# Patient Record
Sex: MALE | Race: WHITE | NOT HISPANIC OR LATINO | Employment: OTHER | URBAN - METROPOLITAN AREA
[De-identification: names, ages, dates, MRNs, and addresses within clinical notes are randomized per-mention and may not be internally consistent; named-entity substitution may affect disease eponyms.]

---

## 2021-07-31 ENCOUNTER — HOSPITAL ENCOUNTER (INPATIENT)
Facility: HOSPITAL | Age: 41
LOS: 3 days | Discharge: HOME | DRG: 280 | End: 2021-08-03
Attending: EMERGENCY MEDICINE | Admitting: INTERNAL MEDICINE
Payer: COMMERCIAL

## 2021-07-31 ENCOUNTER — APPOINTMENT (EMERGENCY)
Dept: RADIOLOGY | Facility: HOSPITAL | Age: 41
DRG: 280 | End: 2021-07-31
Payer: COMMERCIAL

## 2021-07-31 ENCOUNTER — APPOINTMENT (EMERGENCY)
Dept: CT IMAGING | Facility: HOSPITAL | Age: 41
DRG: 280 | End: 2021-07-31
Payer: COMMERCIAL

## 2021-07-31 DIAGNOSIS — R06.00 DYSPNEA: ICD-10-CM

## 2021-07-31 DIAGNOSIS — R14.0 ABDOMINAL DISTENSION: Primary | ICD-10-CM

## 2021-07-31 DIAGNOSIS — K76.9 LIVER LESION, LEFT LOBE: ICD-10-CM

## 2021-07-31 DIAGNOSIS — R79.89 ELEVATED LACTIC ACID LEVEL: ICD-10-CM

## 2021-07-31 DIAGNOSIS — K21.9 GASTROESOPHAGEAL REFLUX DISEASE, UNSPECIFIED WHETHER ESOPHAGITIS PRESENT: ICD-10-CM

## 2021-07-31 DIAGNOSIS — K80.20 CHOLELITHIASIS WITHOUT CHOLECYSTITIS: ICD-10-CM

## 2021-07-31 DIAGNOSIS — R19.7 DIARRHEA, UNSPECIFIED TYPE: ICD-10-CM

## 2021-07-31 DIAGNOSIS — F10.20 ALCOHOL USE DISORDER, SEVERE, DEPENDENCE (HCC): ICD-10-CM

## 2021-07-31 DIAGNOSIS — E11.649 HYPOGLYCEMIC EPISODE IN PATIENT WITH DIABETES MELLITUS (HCC): ICD-10-CM

## 2021-07-31 DIAGNOSIS — F10.10 ALCOHOL ABUSE: ICD-10-CM

## 2021-07-31 DIAGNOSIS — R10.9 ABDOMINAL PAIN: ICD-10-CM

## 2021-07-31 DIAGNOSIS — K74.60 CIRRHOSIS OF LIVER WITHOUT ASCITES (HCC): ICD-10-CM

## 2021-07-31 LAB
ALBUMIN SERPL BCP-MCNC: 3.7 G/DL (ref 3.5–5)
ALP SERPL-CCNC: 110 U/L (ref 46–116)
ALT SERPL W P-5'-P-CCNC: 94 U/L (ref 12–78)
ANION GAP SERPL CALCULATED.3IONS-SCNC: 11 MMOL/L (ref 4–13)
APTT PPP: 26 SECONDS (ref 23–37)
AST SERPL W P-5'-P-CCNC: 89 U/L (ref 5–45)
BASOPHILS # BLD AUTO: 0.04 THOUSANDS/ΜL (ref 0–0.1)
BASOPHILS NFR BLD AUTO: 0 % (ref 0–1)
BILIRUB DIRECT SERPL-MCNC: 0.28 MG/DL (ref 0–0.2)
BILIRUB SERPL-MCNC: 0.99 MG/DL (ref 0.2–1)
BILIRUB UR QL STRIP: NEGATIVE
BUN SERPL-MCNC: 9 MG/DL (ref 5–25)
CALCIUM SERPL-MCNC: 9.1 MG/DL (ref 8.3–10.1)
CHLORIDE SERPL-SCNC: 98 MMOL/L (ref 100–108)
CLARITY UR: CLEAR
CO2 SERPL-SCNC: 29 MMOL/L (ref 21–32)
COLOR UR: ABNORMAL
CREAT SERPL-MCNC: 1.06 MG/DL (ref 0.6–1.3)
EOSINOPHIL # BLD AUTO: 0.06 THOUSAND/ΜL (ref 0–0.61)
EOSINOPHIL NFR BLD AUTO: 1 % (ref 0–6)
ERYTHROCYTE [DISTWIDTH] IN BLOOD BY AUTOMATED COUNT: 13.8 % (ref 11.6–15.1)
ETHANOL SERPL-MCNC: <3 MG/DL (ref 0–3)
GFR SERPL CREATININE-BSD FRML MDRD: 86 ML/MIN/1.73SQ M
GLUCOSE SERPL-MCNC: 161 MG/DL (ref 65–140)
GLUCOSE SERPL-MCNC: 31 MG/DL (ref 65–140)
GLUCOSE SERPL-MCNC: 96 MG/DL (ref 65–140)
GLUCOSE UR STRIP-MCNC: ABNORMAL MG/DL
HCT VFR BLD AUTO: 45.5 % (ref 36.5–49.3)
HGB BLD-MCNC: 15 G/DL (ref 12–17)
HGB UR QL STRIP.AUTO: NEGATIVE
IMM GRANULOCYTES # BLD AUTO: 0.04 THOUSAND/UL (ref 0–0.2)
IMM GRANULOCYTES NFR BLD AUTO: 0 % (ref 0–2)
INR PPP: 1.06 (ref 0.84–1.19)
KETONES UR STRIP-MCNC: NEGATIVE MG/DL
LACTATE SERPL-SCNC: 1.9 MMOL/L (ref 0.5–2)
LACTATE SERPL-SCNC: 2.6 MMOL/L (ref 0.5–2)
LEUKOCYTE ESTERASE UR QL STRIP: NEGATIVE
LIPASE SERPL-CCNC: 66 U/L (ref 73–393)
LYMPHOCYTES # BLD AUTO: 2.47 THOUSANDS/ΜL (ref 0.6–4.47)
LYMPHOCYTES NFR BLD AUTO: 26 % (ref 14–44)
MAGNESIUM SERPL-MCNC: 1.9 MG/DL (ref 1.6–2.6)
MCH RBC QN AUTO: 31.6 PG (ref 26.8–34.3)
MCHC RBC AUTO-ENTMCNC: 33 G/DL (ref 31.4–37.4)
MCV RBC AUTO: 96 FL (ref 82–98)
MONOCYTES # BLD AUTO: 0.86 THOUSAND/ΜL (ref 0.17–1.22)
MONOCYTES NFR BLD AUTO: 9 % (ref 4–12)
NEUTROPHILS # BLD AUTO: 5.94 THOUSANDS/ΜL (ref 1.85–7.62)
NEUTS SEG NFR BLD AUTO: 64 % (ref 43–75)
NITRITE UR QL STRIP: NEGATIVE
NRBC BLD AUTO-RTO: 0 /100 WBCS
NT-PROBNP SERPL-MCNC: 35 PG/ML
PH UR STRIP.AUTO: 6 [PH]
PLATELET # BLD AUTO: 254 THOUSANDS/UL (ref 149–390)
PMV BLD AUTO: 10.6 FL (ref 8.9–12.7)
POTASSIUM SERPL-SCNC: 4.1 MMOL/L (ref 3.5–5.3)
PROT SERPL-MCNC: 8.1 G/DL (ref 6.4–8.2)
PROT UR STRIP-MCNC: NEGATIVE MG/DL
PROTHROMBIN TIME: 13.3 SECONDS (ref 11.6–14.5)
RBC # BLD AUTO: 4.74 MILLION/UL (ref 3.88–5.62)
SODIUM SERPL-SCNC: 138 MMOL/L (ref 136–145)
SP GR UR STRIP.AUTO: <=1.005 (ref 1–1.03)
TROPONIN I SERPL-MCNC: <0.02 NG/ML
UROBILINOGEN UR QL STRIP.AUTO: 0.2 E.U./DL
WBC # BLD AUTO: 9.41 THOUSAND/UL (ref 4.31–10.16)

## 2021-07-31 PROCEDURE — 87040 BLOOD CULTURE FOR BACTERIA: CPT | Performed by: EMERGENCY MEDICINE

## 2021-07-31 PROCEDURE — 82077 ASSAY SPEC XCP UR&BREATH IA: CPT | Performed by: EMERGENCY MEDICINE

## 2021-07-31 PROCEDURE — 71045 X-RAY EXAM CHEST 1 VIEW: CPT

## 2021-07-31 PROCEDURE — 80076 HEPATIC FUNCTION PANEL: CPT | Performed by: EMERGENCY MEDICINE

## 2021-07-31 PROCEDURE — 83735 ASSAY OF MAGNESIUM: CPT | Performed by: EMERGENCY MEDICINE

## 2021-07-31 PROCEDURE — 85730 THROMBOPLASTIN TIME PARTIAL: CPT | Performed by: EMERGENCY MEDICINE

## 2021-07-31 PROCEDURE — 81003 URINALYSIS AUTO W/O SCOPE: CPT | Performed by: EMERGENCY MEDICINE

## 2021-07-31 PROCEDURE — 84145 PROCALCITONIN (PCT): CPT | Performed by: EMERGENCY MEDICINE

## 2021-07-31 PROCEDURE — 71275 CT ANGIOGRAPHY CHEST: CPT

## 2021-07-31 PROCEDURE — 83880 ASSAY OF NATRIURETIC PEPTIDE: CPT | Performed by: EMERGENCY MEDICINE

## 2021-07-31 PROCEDURE — 83605 ASSAY OF LACTIC ACID: CPT | Performed by: EMERGENCY MEDICINE

## 2021-07-31 PROCEDURE — 36415 COLL VENOUS BLD VENIPUNCTURE: CPT | Performed by: EMERGENCY MEDICINE

## 2021-07-31 PROCEDURE — 96361 HYDRATE IV INFUSION ADD-ON: CPT

## 2021-07-31 PROCEDURE — 93005 ELECTROCARDIOGRAM TRACING: CPT

## 2021-07-31 PROCEDURE — 80048 BASIC METABOLIC PNL TOTAL CA: CPT | Performed by: EMERGENCY MEDICINE

## 2021-07-31 PROCEDURE — 85610 PROTHROMBIN TIME: CPT | Performed by: EMERGENCY MEDICINE

## 2021-07-31 PROCEDURE — 99285 EMERGENCY DEPT VISIT HI MDM: CPT | Performed by: EMERGENCY MEDICINE

## 2021-07-31 PROCEDURE — 82948 REAGENT STRIP/BLOOD GLUCOSE: CPT

## 2021-07-31 PROCEDURE — 84484 ASSAY OF TROPONIN QUANT: CPT | Performed by: EMERGENCY MEDICINE

## 2021-07-31 PROCEDURE — 96374 THER/PROPH/DIAG INJ IV PUSH: CPT

## 2021-07-31 PROCEDURE — 85025 COMPLETE CBC W/AUTO DIFF WBC: CPT | Performed by: EMERGENCY MEDICINE

## 2021-07-31 PROCEDURE — 99285 EMERGENCY DEPT VISIT HI MDM: CPT

## 2021-07-31 PROCEDURE — 83690 ASSAY OF LIPASE: CPT | Performed by: EMERGENCY MEDICINE

## 2021-07-31 PROCEDURE — 74177 CT ABD & PELVIS W/CONTRAST: CPT

## 2021-07-31 RX ORDER — MORPHINE SULFATE 4 MG/ML
4 INJECTION, SOLUTION INTRAMUSCULAR; INTRAVENOUS ONCE
Status: COMPLETED | OUTPATIENT
Start: 2021-07-31 | End: 2021-07-31

## 2021-07-31 RX ORDER — FOLIC ACID 1 MG/1
1 TABLET ORAL DAILY
Status: DISCONTINUED | OUTPATIENT
Start: 2021-08-01 | End: 2021-08-03 | Stop reason: HOSPADM

## 2021-07-31 RX ORDER — LORAZEPAM 1 MG/1
1 TABLET ORAL ONCE
Status: COMPLETED | OUTPATIENT
Start: 2021-07-31 | End: 2021-07-31

## 2021-07-31 RX ORDER — DEXTROSE MONOHYDRATE 25 G/50ML
50 INJECTION, SOLUTION INTRAVENOUS ONCE
Status: COMPLETED | OUTPATIENT
Start: 2021-07-31 | End: 2021-07-31

## 2021-07-31 RX ORDER — LANOLIN ALCOHOL/MO/W.PET/CERES
100 CREAM (GRAM) TOPICAL DAILY
Status: DISCONTINUED | OUTPATIENT
Start: 2021-08-01 | End: 2021-08-03 | Stop reason: HOSPADM

## 2021-07-31 RX ADMIN — LORAZEPAM 1 MG: 1 TABLET ORAL at 22:28

## 2021-07-31 RX ADMIN — SODIUM CHLORIDE 500 ML: 0.9 INJECTION, SOLUTION INTRAVENOUS at 20:05

## 2021-07-31 RX ADMIN — MORPHINE SULFATE 4 MG: 4 INJECTION INTRAVENOUS at 20:05

## 2021-07-31 RX ADMIN — IOHEXOL 100 ML: 350 INJECTION, SOLUTION INTRAVENOUS at 20:52

## 2021-07-31 RX ADMIN — DEXTROSE MONOHYDRATE 50 ML: 25 INJECTION, SOLUTION INTRAVENOUS at 22:28

## 2021-08-01 ENCOUNTER — APPOINTMENT (INPATIENT)
Dept: MRI IMAGING | Facility: HOSPITAL | Age: 41
DRG: 280 | End: 2021-08-01
Payer: COMMERCIAL

## 2021-08-01 ENCOUNTER — APPOINTMENT (INPATIENT)
Dept: RADIOLOGY | Facility: HOSPITAL | Age: 41
DRG: 280 | End: 2021-08-01
Payer: COMMERCIAL

## 2021-08-01 PROBLEM — R65.10 SIRS (SYSTEMIC INFLAMMATORY RESPONSE SYNDROME) (HCC): Status: ACTIVE | Noted: 2021-08-01

## 2021-08-01 PROBLEM — K70.30 ALCOHOLIC CIRRHOSIS OF LIVER WITHOUT ASCITES (HCC): Status: ACTIVE | Noted: 2021-08-01

## 2021-08-01 PROBLEM — K21.9 GERD (GASTROESOPHAGEAL REFLUX DISEASE): Status: ACTIVE | Noted: 2021-08-01

## 2021-08-01 PROBLEM — E10.9 DIABETES MELLITUS TYPE 1 (HCC): Status: ACTIVE | Noted: 2021-08-01

## 2021-08-01 PROBLEM — K76.9 HEPATIC LESION: Status: ACTIVE | Noted: 2021-08-01

## 2021-08-01 PROBLEM — F10.20 ALCOHOL USE DISORDER, SEVERE, DEPENDENCE (HCC): Status: ACTIVE | Noted: 2021-08-01

## 2021-08-01 LAB
AFP-TM SERPL-MCNC: 4.2 NG/ML (ref 0.5–8)
ATRIAL RATE: 103 BPM
ATRIAL RATE: 109 BPM
GLUCOSE SERPL-MCNC: 313 MG/DL (ref 65–140)
GLUCOSE SERPL-MCNC: 331 MG/DL (ref 65–140)
GLUCOSE SERPL-MCNC: 332 MG/DL (ref 65–140)
GLUCOSE SERPL-MCNC: 353 MG/DL (ref 65–140)
GLUCOSE SERPL-MCNC: 359 MG/DL (ref 65–140)
P AXIS: 115 DEGREES
P AXIS: 59 DEGREES
PR INTERVAL: 132 MS
PR INTERVAL: 136 MS
PROCALCITONIN SERPL-MCNC: 0.07 NG/ML
QRS AXIS: -6 DEGREES
QRS AXIS: 183 DEGREES
QRSD INTERVAL: 68 MS
QRSD INTERVAL: 76 MS
QT INTERVAL: 338 MS
QT INTERVAL: 348 MS
QTC INTERVAL: 455 MS
QTC INTERVAL: 455 MS
SARS-COV-2 RNA RESP QL NAA+PROBE: NEGATIVE
T WAVE AXIS: 139 DEGREES
T WAVE AXIS: 44 DEGREES
TROPONIN I SERPL-MCNC: <0.02 NG/ML
VENTRICULAR RATE: 103 BPM
VENTRICULAR RATE: 109 BPM

## 2021-08-01 PROCEDURE — 82948 REAGENT STRIP/BLOOD GLUCOSE: CPT

## 2021-08-01 PROCEDURE — 94660 CPAP INITIATION&MGMT: CPT

## 2021-08-01 PROCEDURE — 86235 NUCLEAR ANTIGEN ANTIBODY: CPT | Performed by: PHYSICIAN ASSISTANT

## 2021-08-01 PROCEDURE — 99223 1ST HOSP IP/OBS HIGH 75: CPT | Performed by: FAMILY MEDICINE

## 2021-08-01 PROCEDURE — U0003 INFECTIOUS AGENT DETECTION BY NUCLEIC ACID (DNA OR RNA); SEVERE ACUTE RESPIRATORY SYNDROME CORONAVIRUS 2 (SARS-COV-2) (CORONAVIRUS DISEASE [COVID-19]), AMPLIFIED PROBE TECHNIQUE, MAKING USE OF HIGH THROUGHPUT TECHNOLOGIES AS DESCRIBED BY CMS-2020-01-R: HCPCS | Performed by: PHYSICIAN ASSISTANT

## 2021-08-01 PROCEDURE — 86256 FLUORESCENT ANTIBODY TITER: CPT | Performed by: PHYSICIAN ASSISTANT

## 2021-08-01 PROCEDURE — 36415 COLL VENOUS BLD VENIPUNCTURE: CPT | Performed by: PHYSICIAN ASSISTANT

## 2021-08-01 PROCEDURE — U0005 INFEC AGEN DETEC AMPLI PROBE: HCPCS | Performed by: PHYSICIAN ASSISTANT

## 2021-08-01 PROCEDURE — A9585 GADOBUTROL INJECTION: HCPCS | Performed by: PHYSICIAN ASSISTANT

## 2021-08-01 PROCEDURE — 94760 N-INVAS EAR/PLS OXIMETRY 1: CPT

## 2021-08-01 PROCEDURE — 74183 MRI ABD W/O CNTR FLWD CNTR: CPT

## 2021-08-01 PROCEDURE — 84484 ASSAY OF TROPONIN QUANT: CPT | Performed by: PHYSICIAN ASSISTANT

## 2021-08-01 PROCEDURE — 80074 ACUTE HEPATITIS PANEL: CPT | Performed by: PHYSICIAN ASSISTANT

## 2021-08-01 PROCEDURE — 99255 IP/OBS CONSLTJ NEW/EST HI 80: CPT | Performed by: INTERNAL MEDICINE

## 2021-08-01 PROCEDURE — G1004 CDSM NDSC: HCPCS

## 2021-08-01 PROCEDURE — 87522 HEPATITIS C REVRS TRNSCRPJ: CPT | Performed by: PHYSICIAN ASSISTANT

## 2021-08-01 PROCEDURE — 82105 ALPHA-FETOPROTEIN SERUM: CPT | Performed by: PHYSICIAN ASSISTANT

## 2021-08-01 PROCEDURE — 93010 ELECTROCARDIOGRAM REPORT: CPT | Performed by: INTERNAL MEDICINE

## 2021-08-01 RX ORDER — ACETAMINOPHEN 325 MG/1
650 TABLET ORAL EVERY 6 HOURS PRN
Status: DISCONTINUED | OUTPATIENT
Start: 2021-08-01 | End: 2021-08-03 | Stop reason: HOSPADM

## 2021-08-01 RX ORDER — POLYETHYLENE GLYCOL 3350 17 G/17G
238 POWDER, FOR SOLUTION ORAL ONCE
Status: COMPLETED | OUTPATIENT
Start: 2021-08-01 | End: 2021-08-01

## 2021-08-01 RX ORDER — LISINOPRIL 20 MG/1
20 TABLET ORAL 2 TIMES DAILY
COMMUNITY

## 2021-08-01 RX ORDER — LIDOCAINE HYDROCHLORIDE 20 MG/ML
15 SOLUTION OROPHARYNGEAL 4 TIMES DAILY PRN
Status: DISCONTINUED | OUTPATIENT
Start: 2021-08-01 | End: 2021-08-03 | Stop reason: HOSPADM

## 2021-08-01 RX ORDER — INSULIN GLARGINE 100 [IU]/ML
INJECTION, SOLUTION SUBCUTANEOUS
COMMUNITY

## 2021-08-01 RX ORDER — OMEGA-3S/DHA/EPA/FISH OIL/D3 300MG-1000
400 CAPSULE ORAL DAILY
COMMUNITY

## 2021-08-01 RX ORDER — POLYETHYLENE GLYCOL 3350 17 G/17G
17 POWDER, FOR SOLUTION ORAL DAILY
Status: DISCONTINUED | OUTPATIENT
Start: 2021-08-01 | End: 2021-08-03 | Stop reason: HOSPADM

## 2021-08-01 RX ORDER — INSULIN GLARGINE 100 [IU]/ML
40 INJECTION, SOLUTION SUBCUTANEOUS
Status: DISCONTINUED | OUTPATIENT
Start: 2021-08-01 | End: 2021-08-02

## 2021-08-01 RX ORDER — INSULIN GLARGINE 100 [IU]/ML
35 INJECTION, SOLUTION SUBCUTANEOUS EVERY MORNING
Status: DISCONTINUED | OUTPATIENT
Start: 2021-08-01 | End: 2021-08-02

## 2021-08-01 RX ORDER — LANOLIN ALCOHOL/MO/W.PET/CERES
6 CREAM (GRAM) TOPICAL
Status: DISCONTINUED | OUTPATIENT
Start: 2021-08-01 | End: 2021-08-03 | Stop reason: HOSPADM

## 2021-08-01 RX ORDER — DIPHENOXYLATE HYDROCHLORIDE AND ATROPINE SULFATE 2.5; .025 MG/1; MG/1
1 TABLET ORAL DAILY
COMMUNITY

## 2021-08-01 RX ORDER — CHLORDIAZEPOXIDE HYDROCHLORIDE 10 MG/1
10 CAPSULE, GELATIN COATED ORAL EVERY 8 HOURS SCHEDULED
Status: COMPLETED | OUTPATIENT
Start: 2021-08-02 | End: 2021-08-02

## 2021-08-01 RX ORDER — DICYCLOMINE HCL 20 MG
20 TABLET ORAL 3 TIMES DAILY PRN
Status: DISCONTINUED | OUTPATIENT
Start: 2021-08-01 | End: 2021-08-03 | Stop reason: HOSPADM

## 2021-08-01 RX ORDER — PANTOPRAZOLE SODIUM 40 MG/1
40 TABLET, DELAYED RELEASE ORAL DAILY
COMMUNITY

## 2021-08-01 RX ORDER — MAGNESIUM HYDROXIDE/ALUMINUM HYDROXICE/SIMETHICONE 120; 1200; 1200 MG/30ML; MG/30ML; MG/30ML
30 SUSPENSION ORAL EVERY 6 HOURS PRN
Status: DISCONTINUED | OUTPATIENT
Start: 2021-08-01 | End: 2021-08-03 | Stop reason: HOSPADM

## 2021-08-01 RX ORDER — CHLORDIAZEPOXIDE HYDROCHLORIDE 10 MG/1
10 CAPSULE, GELATIN COATED ORAL EVERY 6 HOURS SCHEDULED
Status: COMPLETED | OUTPATIENT
Start: 2021-08-01 | End: 2021-08-01

## 2021-08-01 RX ORDER — FUROSEMIDE 20 MG/1
20 TABLET ORAL DAILY
Status: DISCONTINUED | OUTPATIENT
Start: 2021-08-01 | End: 2021-08-03 | Stop reason: HOSPADM

## 2021-08-01 RX ORDER — DICYCLOMINE HCL 20 MG
20 TABLET ORAL 3 TIMES DAILY
COMMUNITY

## 2021-08-01 RX ORDER — LISINOPRIL 20 MG/1
20 TABLET ORAL 2 TIMES DAILY
Status: DISCONTINUED | OUTPATIENT
Start: 2021-08-01 | End: 2021-08-03 | Stop reason: HOSPADM

## 2021-08-01 RX ORDER — FUROSEMIDE 20 MG/1
20 TABLET ORAL DAILY
COMMUNITY

## 2021-08-01 RX ORDER — CHLORDIAZEPOXIDE HYDROCHLORIDE 10 MG/1
10 CAPSULE, GELATIN COATED ORAL
Status: DISCONTINUED | OUTPATIENT
Start: 2021-08-04 | End: 2021-08-03 | Stop reason: HOSPADM

## 2021-08-01 RX ORDER — NICOTINE 21 MG/24HR
14 PATCH, TRANSDERMAL 24 HOURS TRANSDERMAL DAILY
Status: DISCONTINUED | OUTPATIENT
Start: 2021-08-01 | End: 2021-08-03 | Stop reason: HOSPADM

## 2021-08-01 RX ORDER — DOCUSATE SODIUM 100 MG/1
100 CAPSULE, LIQUID FILLED ORAL 2 TIMES DAILY
Status: DISCONTINUED | OUTPATIENT
Start: 2021-08-01 | End: 2021-08-03 | Stop reason: HOSPADM

## 2021-08-01 RX ORDER — CHLORDIAZEPOXIDE HYDROCHLORIDE 10 MG/1
10 CAPSULE, GELATIN COATED ORAL EVERY 12 HOURS SCHEDULED
Status: DISCONTINUED | OUTPATIENT
Start: 2021-08-03 | End: 2021-08-03 | Stop reason: HOSPADM

## 2021-08-01 RX ORDER — PANTOPRAZOLE SODIUM 40 MG/1
40 TABLET, DELAYED RELEASE ORAL
Status: DISCONTINUED | OUTPATIENT
Start: 2021-08-01 | End: 2021-08-03 | Stop reason: HOSPADM

## 2021-08-01 RX ADMIN — INSULIN LISPRO 8 UNITS: 100 INJECTION, SOLUTION INTRAVENOUS; SUBCUTANEOUS at 08:51

## 2021-08-01 RX ADMIN — GADOBUTROL 12 ML: 604.72 INJECTION INTRAVENOUS at 14:26

## 2021-08-01 RX ADMIN — INSULIN GLARGINE 35 UNITS: 100 INJECTION, SOLUTION SUBCUTANEOUS at 08:51

## 2021-08-01 RX ADMIN — ACETAMINOPHEN 650 MG: 325 TABLET, FILM COATED ORAL at 22:15

## 2021-08-01 RX ADMIN — FOLIC ACID 1 MG: 1 TABLET ORAL at 08:50

## 2021-08-01 RX ADMIN — THIAMINE HCL TAB 100 MG 100 MG: 100 TAB at 08:50

## 2021-08-01 RX ADMIN — POLYETHYLENE GLYCOL 3350 17 G: 17 POWDER, FOR SOLUTION ORAL at 15:15

## 2021-08-01 RX ADMIN — LISINOPRIL 20 MG: 20 TABLET ORAL at 08:50

## 2021-08-01 RX ADMIN — DOCUSATE SODIUM 100 MG: 100 CAPSULE, LIQUID FILLED ORAL at 19:43

## 2021-08-01 RX ADMIN — CHLORDIAZEPOXIDE HYDROCHLORIDE 10 MG: 10 CAPSULE ORAL at 08:50

## 2021-08-01 RX ADMIN — LISINOPRIL 20 MG: 20 TABLET ORAL at 22:15

## 2021-08-01 RX ADMIN — CHLORDIAZEPOXIDE HYDROCHLORIDE 10 MG: 10 CAPSULE ORAL at 02:15

## 2021-08-01 RX ADMIN — CHLORDIAZEPOXIDE HYDROCHLORIDE 10 MG: 10 CAPSULE ORAL at 19:42

## 2021-08-01 RX ADMIN — INSULIN LISPRO 8 UNITS: 100 INJECTION, SOLUTION INTRAVENOUS; SUBCUTANEOUS at 11:55

## 2021-08-01 RX ADMIN — FUROSEMIDE 20 MG: 40 TABLET ORAL at 08:50

## 2021-08-01 RX ADMIN — MELATONIN TAB 3 MG 6 MG: 3 TAB at 23:02

## 2021-08-01 RX ADMIN — POLYETHYLENE GLYCOL 3350 238 G: 17 POWDER, FOR SOLUTION ORAL at 19:44

## 2021-08-01 RX ADMIN — ENOXAPARIN SODIUM 40 MG: 40 INJECTION SUBCUTANEOUS at 08:51

## 2021-08-01 RX ADMIN — INSULIN GLARGINE 40 UNITS: 100 INJECTION, SOLUTION SUBCUTANEOUS at 22:15

## 2021-08-01 RX ADMIN — INSULIN LISPRO 10 UNITS: 100 INJECTION, SOLUTION INTRAVENOUS; SUBCUTANEOUS at 22:16

## 2021-08-01 RX ADMIN — CHLORDIAZEPOXIDE HYDROCHLORIDE 10 MG: 10 CAPSULE ORAL at 15:14

## 2021-08-01 RX ADMIN — POLYETHYLENE GLYCOL 3350 17 G: 17 POWDER, FOR SOLUTION ORAL at 10:30

## 2021-08-01 RX ADMIN — MULTIPLE VITAMINS W/ MINERALS TAB 1 TABLET: TAB ORAL at 08:50

## 2021-08-01 RX ADMIN — DOCUSATE SODIUM 100 MG: 100 CAPSULE, LIQUID FILLED ORAL at 10:30

## 2021-08-01 RX ADMIN — NICOTINE 14 MG: 14 PATCH, EXTENDED RELEASE TRANSDERMAL at 22:15

## 2021-08-01 NOTE — UTILIZATION REVIEW
Initial Clinical Review    Admission: Date/Time/Statement:   Admission Orders (From admission, onward)     Ordered        07/31/21 2204  Inpatient Admission  Once                   Orders Placed This Encounter   Procedures    Inpatient Admission     Standing Status:   Standing     Number of Occurrences:   1     Order Specific Question:   Level of Care     Answer:   Med Surg [16]     Order Specific Question:   Estimated length of stay     Answer:   More than 2 Midnights     Order Specific Question:   Certification     Answer:   I certify that inpatient services are medically necessary for this patient for a duration of greater than two midnights  See H&P and MD Progress Notes for additional information about the patient's course of treatment  ED Arrival Information     Expected Arrival Acuity    - 7/31/2021 18:58 Urgent         Means of arrival Escorted by Service Admission type    [address] [place] [facility] General Medicine Urgent         Arrival complaint    Abdominal Pain/Pressure and leg & Back Pain        Chief Complaint   Patient presents with    Abdominal Pain     Patient reports abdominal pain for the last several months  Worsening today  Also has back pain  Denies n/v  Initial Presentation:   40y Male to ED presents with gradual worsening of chronic abdominal distension pain, specifically to left  Noted intermittent abdominal pain and abdominal distention past 6 months  20 lb wt gain last 2 months and poor appetite  Drinks alcohol heavily approx 6-10 20 oz cans of liquor daily  PMH for DM Type 2, GERD, Obesity and Alcohol use  Admit Inpatient level of care for Alcoholic cirrhosis of liver, Hepatic lesion and SIRS  Gastroenterology consult  Liver enzymes slightly elevated, avoid hepatotoxic agents  Drinks Tha's Hard lemonade 6-10 20 oz cans daily  CIWA assess  Start Librium taper  Bld culture x2 pending  Lactic acid elevated 2 6   CT a negative for PE, negative for ascites but revealing cirrhosis of the liver, multiple hemangiomas and a hypodense lesion  On exam; Abdomen is firm  Abdominal tenderness in the epigastric area and LUQ  + Guarding  Date: 8/1  Day 2:   GI cons; ETOH/ HCV cirrhosis, Abdominal pain and distension, Diarrhea, Nausea, Poor appetite and Indeterminate liver lesion on CT  MELD-8  Plan for EGD and Colonoscopy tomorrow 6/2  Cr Guerra Continue Dicyclomine and Pantoprazole  F/u MRI and AFP  Thiamine and folic acid  Monitor for signs of withdrawal      CIWA assess = 7      ED Triage Vitals [07/31/21 1904]   Temperature Pulse Respirations Blood Pressure SpO2   98 1 °F (36 7 °C) (!) 120 18 (!) 178/106 96 %      Temp Source Heart Rate Source Patient Position - Orthostatic VS BP Location FiO2 (%)   Temporal Monitor Sitting Left arm --      Pain Score       6          Wt Readings from Last 1 Encounters:   07/31/21 123 kg (272 lb)     Additional Vital Signs:   08/01/21 0847  97 8 °F (36 6 °C)  90  20  151/90  --  97 %  None (Room air)  --  Sitting   08/01/21 0645  --  75  22  157/79  102  97 %  --  --  --   08/01/21 0631  --  77  12  --  --  96 %  --  Face mask  --   08/01/21 0545  --  78  18  150/74  --  97 %  Nasal cannula         07/31/21 2233  --  99  --  148/100  --  --  --  --  --   07/31/21 2230  --  97  32  Abnormal   --  --  --  --  --  --   07/31/21 2145  --  97  23  Abnormal   --  --  95 %  --  --  --   07/31/21 2015  --  102  23  Abnormal   138/77  100  95 %  --  --  --   07/31/21 1945  --  119  Abnormal   --  --  --  95 %  --  --  --   07/31/21 1937  --  --  --  --  --  --  None (Room air)  --       Pertinent Labs/Diagnostic Test Results:   7/31  CTA chest CT abd/pelvis - There is no pulmonary embolism  The liver is cirrhotic  There is an indeterminate 0 9 x 0 8 cm hypodense lesion in the left hepatic lobe segment 2/3 (series 2 images 260 )  Recommended abdomen MRI with and without contrast on a nonemergent basis to characterize    Cholelithiasis without CT evidence of acute cholecystitis  There is no ascites to account for abdominal distention      PCXR -      Results from last 7 days   Lab Units 08/01/21  0321   SARS-COV-2  Negative     Results from last 7 days   Lab Units 07/31/21 2004   WBC Thousand/uL 9 41   HEMOGLOBIN g/dL 15 0   HEMATOCRIT % 45 5   PLATELETS Thousands/uL 254   NEUTROS ABS Thousands/µL 5 94         Results from last 7 days   Lab Units 07/31/21 2004   SODIUM mmol/L 138   POTASSIUM mmol/L 4 1   CHLORIDE mmol/L 98*   CO2 mmol/L 29   ANION GAP mmol/L 11   BUN mg/dL 9   CREATININE mg/dL 1 06   EGFR ml/min/1 73sq m 86   CALCIUM mg/dL 9 1   MAGNESIUM mg/dL 1 9     Results from last 7 days   Lab Units 07/31/21 2004   AST U/L 89*   ALT U/L 94*   ALK PHOS U/L 110   TOTAL PROTEIN g/dL 8 1   ALBUMIN g/dL 3 7   TOTAL BILIRUBIN mg/dL 0 99   BILIRUBIN DIRECT mg/dL 0 28*     Results from last 7 days   Lab Units 08/01/21  0801 07/31/21  2304 07/31/21  2219   POC GLUCOSE mg/dl 313* 96 31*     Results from last 7 days   Lab Units 07/31/21 2004   GLUCOSE RANDOM mg/dL 161*       Results from last 7 days   Lab Units 08/01/21  0321 07/31/21 2004   TROPONIN I ng/mL <0 02 <0 02         Results from last 7 days   Lab Units 07/31/21 2004   PROTIME seconds 13 3   INR  1 06   PTT seconds 26             Results from last 7 days   Lab Units 07/31/21  2310 07/31/21 2004   LACTIC ACID mmol/L 1 9 2 6*             Results from last 7 days   Lab Units 07/31/21 2004   NT-PRO BNP pg/mL 35             Results from last 7 days   Lab Units 07/31/21 2004   LIPASE u/L 66*             Results from last 7 days   Lab Units 07/31/21 2004   CLARITY UA  Clear   COLOR UA  Light Yellow   SPEC GRAV UA  <=1 005   PH UA  6 0   GLUCOSE UA mg/dl 500 (1/2%)*   KETONES UA mg/dl Negative   BLOOD UA  Negative   PROTEIN UA mg/dl Negative   NITRITE UA  Negative   BILIRUBIN UA  Negative   UROBILINOGEN UA E U /dl 0 2   LEUKOCYTES UA  Negative       Results from last 7 days   Lab Units 07/31/21  2310   ETHANOL LVL mg/dL <3       ED Treatment:   Medication Administration from 07/31/2021 1858 to 08/01/2021 1023       Date/Time Order Dose Route Action     07/31/2021 2005 sodium chloride 0 9 % bolus 500 mL 500 mL Intravenous New Bag     07/31/2021 2005 morphine (PF) 4 mg/mL injection 4 mg 4 mg Intravenous Given     07/31/2021 2052 iohexol (OMNIPAQUE) 350 MG/ML injection (MULTI-DOSE) 100 mL 100 mL Intravenous Given     07/31/2021 2228 LORazepam (ATIVAN) tablet 1 mg 1 mg Oral Given     07/31/2021 2228 dextrose 50 % IV solution 50 mL 50 mL Intravenous Given     08/01/2021 0850 thiamine tablet 100 mg 100 mg Oral Given     92/52/6479 7277 folic acid (FOLVITE) tablet 1 mg 1 mg Oral Given     08/01/2021 0850 multivitamin-minerals (CENTRUM) tablet 1 tablet 1 tablet Oral Given     08/01/2021 0850 furosemide (LASIX) tablet 20 mg 20 mg Oral Given     08/01/2021 0851 insulin glargine (LANTUS) subcutaneous injection 35 Units 0 35 mL 35 Units Subcutaneous Given     08/01/2021 0850 lisinopril (ZESTRIL) tablet 20 mg 20 mg Oral Given     08/01/2021 0851 enoxaparin (LOVENOX) subcutaneous injection 40 mg 40 mg Subcutaneous Given     08/01/2021 0851 insulin lispro (HumaLOG) 100 units/mL subcutaneous injection 2-12 Units 8 Units Subcutaneous Given     08/01/2021 0850 chlordiazePOXIDE (LIBRIUM) capsule 10 mg 10 mg Oral Given     08/01/2021 0215 chlordiazePOXIDE (LIBRIUM) capsule 10 mg 10 mg Oral Given        Past Medical History:   Diagnosis Date    Diabetes mellitus ([facility] )     GERD (gastroesophageal reflux disease)     Hypertension      Present on Admission:   Alcoholic cirrhosis of liver without ascites (HCC)   Diabetes mellitus type 1 (HCC)   SIRS (systemic inflammatory response syndrome) ([facility] )   Alcohol use disorder, severe, dependence ([facility] )      Admitting Diagnosis: Abdominal pain [R10 9]  Back pain [M54 9]  Age/Sex: 42 y o  male     Admission Orders:  Scheduled Medications:  chlordiazePOXIDE, 10 mg, Oral, Q6H [facility] & intermediate   Followed by  Dominik Bridges ON 8/2/2021] chlordiazePOXIDE, 10 mg, Oral, Q8H [address]   Followed by  Michael Guerrero ON 8/3/2021] chlordiazePOXIDE, 10 mg, Oral, Q12H [address]   Followed by  Michael Guerrero ON 8/4/2021] chlordiazePOXIDE, 10 mg, Oral, HS  docusate sodium, 100 mg, Oral, BID  enoxaparin, 40 mg, Subcutaneous, Daily  folic acid, 1 mg, Oral, Daily  furosemide, 20 mg, Oral, Daily  insulin glargine, 35 Units, Subcutaneous, QAM  insulin glargine, 40 Units, Subcutaneous, HS  insulin lispro, 2-12 Units, Subcutaneous, TID AC  insulin lispro, 2-12 Units, Subcutaneous, HS  lisinopril, 20 mg, Oral, BID  multivitamin-minerals, 1 tablet, Oral, Daily  pantoprazole, 40 mg, Oral, Early Morning  polyethylene glycol, 17 g, Oral, Daily  thiamine, 100 mg, Oral, Daily      Continuous IV Infusions: None     PRN Meds:  aluminum-magnesium hydroxide-simethicone, 30 mL, Oral, Q6H PRN  dicyclomine, 20 mg, Oral, TID PRN  Lidocaine Viscous HCl, 15 mL, Swish & Swallow, 4x Daily PRN      Bld cultures x2  CIWA assess  Seizure precautions  Aspiration precautions  IP CONSULT TO GASTROENTEROLOGY  IP CONSULT TO CASE MANAGEMENT    Network Utilization Review Department  ATTENTION: Please call with any questions or concerns to [phone] and carefully listen to the prompts so that you are directed to the right person  All voicemails are confidential   Dheeraj Garvin all requests for admission clinical reviews, approved or denied determinations and any other requests to dedicated fax number below belonging to the campus where the patient is receiving treatment   List of dedicated fax numbers for the Facilities:  [address] DENIALS (Administrative/Medical Necessity) [phone]   [address] (Maternity/NICU/Pediatrics) 261 [facility] [place] 2000 [facility] [place] [address] [address] [facility] [facility] [phone]

## 2021-08-01 NOTE — ASSESSMENT & PLAN NOTE
· Noted on CT a  · Discussed close follow-up back in [place] with GI  · Appreciate GI consult here  · Discussed with patient about follow-up for MRI abdomen

## 2021-08-01 NOTE — ASSESSMENT & PLAN NOTE
· CT a negative for PE, negative for ascites but revealing cirrhosis of the liver, multiple hemangiomas and a hypodense lesion  · Appreciate GI consult due to severe abdominal distension  · Liver enzymes slightly elevated, avoid hepatotoxic agents

## 2021-08-01 NOTE — INCIDENTAL FINDINGS
The following findings require follow up:  Radiographic finding   Finding: "There is an indeterminate 0 9 x 0 8 cm hypodense lesion in the left hepatic lobe segment 2/3 (series 2 images 260 )  Recommended abdomen MRI with and without contrast on a nonemergent basis to characterize "       Follow up required:  With outpatient GI and primary care physician   Follow up should be done within 1 week(s)    Please notify the following clinician to assist with the follow up:   Gastroenterologist and primary care physician

## 2021-08-01 NOTE — ASSESSMENT & PLAN NOTE
· Meeting criteria due to tachycardia which has resolved and tachypnea most likely in the setting of abdominal pain  · No clear infectious source will hold off on antibiotics, BP stable so will hold off on IV fluids, given some in ED  · Blood culture x2 pending  · Lactic acid initially elevated 2 6, repeat WNL at 1 9

## 2021-08-01 NOTE — ASSESSMENT & PLAN NOTE
No results found for: HGBA1C    Recent Labs     07/31/21  2219 07/31/21  2304   POCGLU 31* 96       Blood Sugar Average: Last 72 hrs:  (P) 63 5   · Blood glucose checks q i d , hypoglycemia protocol  · Patient noted initially with blood glucose 168, decreased to 31 and now 96 after D50  · Closely monitor blood glucose but will resume home Lantus 35 units in a m  And 40 units q h s    · Sliding scale insulin

## 2021-08-01 NOTE — ED NOTES
Pt confused and severely diaphoretic  Blood glucose checked and registered at 31mg/dl  Dr Flor Green notified  Full amp D50 given IVP  Pt awake and alert through hypoglycemia event  Snack bag given and sandwich  Pt continued to tremor  I asked pt if he could be withdrawing from alcohol? Pt states yes  1mg Ativan PO   CIWA completed and score is 7  Pt states that he is feeling better  Will continue to monitor blood glucose as per protocol        Wilton Ventura RN  07/31/21 1076

## 2021-08-01 NOTE — ED PROVIDER NOTES
History  Chief Complaint   Patient presents with    Abdominal Pain     Patient reports abdominal pain for the last several months  Worsening today  Also has back pain  Denies n/v       Patient is a 58-year-old male with past medical history of insulin-dependent diabetes, GERD, hypertension, presents to the emergency department complaining of progressively worsening abdominal distension and pressure for the past several months but worsening over the past 1 month  Patient reports that he is currently visiting from [place] and has had ongoing issues with abdominal pain and distension for many months  Over the past 1 month that seems to have worsened and today he was doubled over in discomfort due to the pressure all throughout his abdomen  He states he has had significantly decreased appetite over the past several months and is not eating much despite gaining weight  He also reports feeling more short of breath over the past 1 month, both at rest and with exertion however exertion does worsen his breathing  Patient's brother reports that walking around today he was very winded and even sitting in the waiting room he was winded  Patient was told at 1 point that he may have cirrhosis and he does admit to alcohol use fairly regularly but has been trying to cut down  Patient reports he does feel some achiness in both legs as well  Over the past 1 month he has had intermittent bilious vomiting upon awakening  He denies any nausea or vomiting in the past couple of days  Patient also reports he gets low back pain also for the past 1 month getting progressively worse and at times he feels sharp pain in his right flank  He reports darker colored urine than normal but denies dysuria, gross hematuria or change in frequency  He does state at the end of urination he has more difficulty getting out his urine and has to push harder  This is been also new for the past 1 month  He reports some lightheadedness today    He reports chronic diarrhea  He denies any fever or chills, headache, neck pain or stiffness, cough, hemoptysis, URI symptoms, chest pain, palpitations, constipation, blood per rectum, melena, skin rash or color change, extremity swelling, extremity weakness or paresthesia other focal neurologic deficits  History provided by:  Patient   used: No    Abdominal Pain  Associated symptoms: diarrhea and shortness of breath    Associated symptoms: no chest pain, no chills, no constipation, no cough, no dysuria, no fever, no hematuria, no nausea, no sore throat and no vomiting        None       Past Medical History:   Diagnosis Date    Diabetes mellitus ([facility] [address] )     GERD (gastroesophageal reflux disease)     Hypertension        History reviewed  No pertinent surgical history  History reviewed  No pertinent family history  I have reviewed and agree with the history as documented  E-Cigarette/Vaping     E-Cigarette/Vaping Substances     Social History     Tobacco Use    Smoking status: Never Smoker    Smokeless tobacco: Current User     Types: Chew   Substance Use Topics    Alcohol use: Yes    Drug use: Not Currently     Types: Marijuana       Review of Systems   Constitutional: Positive for appetite change and unexpected weight change  Negative for chills and fever  HENT: Negative for congestion, ear pain, rhinorrhea and sore throat  Eyes: Negative for photophobia, pain and visual disturbance  Respiratory: Positive for shortness of breath  Negative for cough, chest tightness and wheezing  Cardiovascular: Negative for chest pain, palpitations and leg swelling  Gastrointestinal: Positive for abdominal distention, abdominal pain and diarrhea  Negative for blood in stool, constipation, nausea and vomiting  Genitourinary: Positive for difficulty urinating  Negative for dysuria, flank pain, frequency and hematuria     Musculoskeletal: Negative for back pain, neck pain and neck stiffness  Skin: Negative for color change, pallor, rash and wound  Allergic/Immunologic: Negative for immunocompromised state  Neurological: Negative for dizziness, syncope, weakness, light-headedness, numbness and headaches  Hematological: Negative for adenopathy  Does not bruise/bleed easily  Psychiatric/Behavioral: Negative for confusion and decreased concentration  All other systems reviewed and are negative  Physical Exam  Physical Exam  Vitals and nursing note reviewed  Constitutional:       General: He is not in acute distress  Appearance: Normal appearance  He is well-developed  He is not ill-appearing, toxic-appearing or diaphoretic  HENT:      Head: Normocephalic and atraumatic  Right Ear: External ear normal       Left Ear: External ear normal       Nose: Nose normal       Mouth/Throat:      Mouth: Mucous membranes are moist       Pharynx: Oropharynx is clear  No oropharyngeal exudate  Eyes:      Extraocular Movements: Extraocular movements intact  Conjunctiva/sclera: Conjunctivae normal       Pupils: Pupils are equal, round, and reactive to light  Neck:      Vascular: No JVD  Cardiovascular:      Rate and Rhythm: Regular rhythm  Tachycardia present  Pulses: Normal pulses  Heart sounds: Normal heart sounds  No murmur heard  No friction rub  No gallop  Pulmonary:      Effort: Pulmonary effort is normal  No respiratory distress  Breath sounds: Normal breath sounds  No wheezing, rhonchi or rales  Abdominal:      General: Bowel sounds are normal  There is distension  Palpations: Abdomen is soft  Tenderness: There is no abdominal tenderness  There is right CVA tenderness  There is no left CVA tenderness, guarding or rebound  Comments: Diffuse abdominal distension  Abdomen firm  Mild periumbilical and epigastric tenderness  Musculoskeletal:         General: No swelling or tenderness  Normal range of motion        Cervical back: Normal range of motion and neck supple  No rigidity  Skin:     General: Skin is warm and dry  Coloration: Skin is not pale  Findings: No erythema or rash  Neurological:      General: No focal deficit present  Mental Status: He is alert and oriented to person, place, and time  Sensory: No sensory deficit  Motor: No weakness  Psychiatric:         Mood and Affect: Mood normal          Behavior: Behavior normal          Vital Signs  ED Triage Vitals [07/31/21 1904]   Temperature Pulse Respirations Blood Pressure SpO2   98 1 °F (36 7 °C) (!) 120 18 (!) 178/106 96 %      Temp Source Heart Rate Source Patient Position - Orthostatic VS BP Location FiO2 (%)   Temporal Monitor Sitting Left arm --      Pain Score       6         Vitals:    07/31/21 2015 07/31/21 2145 07/31/21 2230 07/31/21 2233   BP: 138/77   148/100   BP Location:       Pulse: 102 97 97 99   Resp: (!) 23 (!) 23 (!) 32    Temp:       TempSrc:       SpO2: 95% 95%     Weight:       Height:           Visual Acuity      ED Medications  Medications   sodium chloride 0 9 % bolus 500 mL (500 mL Intravenous New Bag 7/31/21 2005)   morphine (PF) 4 mg/mL injection 4 mg (4 mg Intravenous Given 7/31/21 2005)   iohexol (OMNIPAQUE) 350 MG/ML injection (MULTI-DOSE) 100 mL (100 mL Intravenous Given 7/31/21 2052)   LORazepam (ATIVAN) tablet 1 mg (1 mg Oral Given 7/31/21 2228)   dextrose 50 % IV solution 50 mL (50 mL Intravenous Given 7/31/21 2228)       Diagnostic Studies  Results Reviewed     Procedure Component Value Units Date/Time    Ethanol [[id number]]     Lab Status: No result Specimen: Blood     Fingerstick Glucose (POCT) [[id number]]  (Abnormal) Collected: 07/31/21 2219    Lab Status: Final result Updated: 07/31/21 2220     POC Glucose 31 mg/dl     Blood culture #2 [[id number]] Collected: 07/31/21 2004    Lab Status:  In process Specimen: Blood from Arm, Left Updated: 07/31/21 2208    Blood culture #1 [[id number]] Collected: 07/31/21 2004 Lab Status: In process Specimen: Blood from Arm, Right Updated: 07/31/21 2208    Lactic acid [[id number]]  (Abnormal) Collected: 07/31/21 2004    Lab Status: Final result Specimen: Blood from Arm, Left Updated: 07/31/21 2043     LACTIC ACID 2 6 mmol/L     Narrative:      Result may be elevated if tourniquet was used during collection      Lactic acid 2 Hours [[id number]]     Lab Status: No result Specimen: Blood     Basic metabolic panel [[id number]]  (Abnormal) Collected: 07/31/21 2004    Lab Status: Final result Specimen: Blood from Arm, Left Updated: 07/31/21 2039     Sodium 138 mmol/L      Potassium 4 1 mmol/L      Chloride 98 mmol/L      CO2 29 mmol/L      ANION GAP 11 mmol/L      BUN 9 mg/dL      Creatinine 1 06 mg/dL      Glucose 161 mg/dL      Calcium 9 1 mg/dL      eGFR 86 ml/min/1 73sq m     Narrative:      [place] guidelines for Chronic Kidney Disease (CKD):     Stage 1 with normal or high GFR (GFR > 90 mL/min/1 73 square meters)    Stage 2 Mild CKD (GFR = 60-89 mL/min/1 73 square meters)    Stage 3A Moderate CKD (GFR = 45-59 mL/min/1 73 square meters)    Stage 3B Moderate CKD (GFR = 30-44 mL/min/1 73 square meters)    Stage 4 Severe CKD (GFR = 15-29 mL/min/1 73 square meters)    Stage 5 End Stage CKD (GFR <15 mL/min/1 73 square meters)  Note: GFR calculation is accurate only with a steady state creatinine    Hepatic function panel [[id number]]  (Abnormal) Collected: 07/31/21 2004    Lab Status: Final result Specimen: Blood from Arm, Left Updated: 07/31/21 2039     Total Bilirubin 0 99 mg/dL      Bilirubin, Direct 0 28 mg/dL      Alkaline Phosphatase 110 U/L      AST 89 U/L      ALT 94 U/L      Total Protein 8 1 g/dL      Albumin 3 7 g/dL     Lipase [[id number]]  (Abnormal) Collected: 07/31/21 2004    Lab Status: Final result Specimen: Blood from Arm, Left Updated: 07/31/21 2039     Lipase 66 u/L     NT-BNP PRO [[id number]]  (Normal) Collected: 07/31/21 2004    Lab Status: Final result Specimen: Blood from Arm, Left Updated: 07/31/21 2039     NT-proBNP 35 pg/mL     Magnesium [[id number]]  (Normal) Collected: 07/31/21 2004    Lab Status: Final result Specimen: Blood from Arm, Left Updated: 07/31/21 2039     Magnesium 1 9 mg/dL     Troponin I [[id number]]  (Normal) Collected: 07/31/21 2004    Lab Status: Final result Specimen: Blood from Arm, Left Updated: 07/31/21 2033     Troponin I <0 02 ng/mL     Protime-INR [[id number]]  (Normal) Collected: 07/31/21 2004    Lab Status: Final result Specimen: Blood from Arm, Left Updated: 07/31/21 2025     Protime 13 3 seconds      INR 1 06    APTT [[id number]]  (Normal) Collected: 07/31/21 2004    Lab Status: Final result Specimen: Blood from Arm, Left Updated: 07/31/21 2025     PTT 26 seconds     UA (URINE) with reflex to Scope [[id number]]  (Abnormal) Collected: 07/31/21 2004    Lab Status: Final result Specimen: Urine, Clean Catch Updated: 07/31/21 2016     Color, UA Light Yellow     Clarity, UA Clear     Specific Gravity, UA <=1 005     pH, UA 6 0     Leukocytes, UA Negative     Nitrite, UA Negative     Protein, UA Negative mg/dl      Glucose,  (1/2%) mg/dl      Ketones, UA Negative mg/dl      Urobilinogen, UA 0 2 E U /dl      Bilirubin, UA Negative     Blood, UA Negative    CBC and differential [[id number]] Collected: 07/31/21 2004    Lab Status: Final result Specimen: Blood from Arm, Left Updated: 07/31/21 2015     WBC 9 41 Thousand/uL      RBC 4 74 Million/uL      Hemoglobin 15 0 g/dL      Hematocrit 45 5 %      MCV 96 fL      MCH 31 6 pg      MCHC 33 0 g/dL      RDW 13 8 %      MPV 10 6 fL      Platelets 362 Thousands/uL      nRBC 0 /100 WBCs      Neutrophils Relative 64 %      Immat GRANS % 0 %      Lymphocytes Relative 26 %      Monocytes Relative 9 %      Eosinophils Relative 1 %      Basophils Relative 0 %      Neutrophils Absolute 5 94 Thousands/µL      Immature Grans Absolute 0 04 Thousand/uL      Lymphocytes Absolute 2 47 Thousands/µL Monocytes Absolute 0 86 Thousand/µL      Eosinophils Absolute 0 06 Thousand/µL      Basophils Absolute 0 04 Thousands/µL     Procalcitonin with AM Reflex [[id number]] Collected: 07/31/21 2004    Lab Status: In process Specimen: Blood from Arm, Left Updated: 07/31/21 2011                 PE Study with CT Abdomen and Pelvis with contrast   Final Result by Hamilton Saravia MD (07/31 2126)      There is no pulmonary embolism  The liver is cirrhotic  There is an indeterminate 0 9 x 0 8 cm hypodense lesion in the left hepatic lobe segment 2/3 (series 2 images 260 )  Recommended abdomen MRI with and without contrast on a nonemergent basis to characterize  Cholelithiasis without CT evidence of acute cholecystitis  There is no ascites to account for abdominal distention  Workstation performed: [id number]         XR chest 1 view portable   ED Interpretation by Millie Wellington DO (07/31 2038)   No acute abnormality in the chest                  Procedures  ECG 12 Lead Documentation Only    Date/Time: 7/31/2021 8:59 PM  Performed by: Millie Wellington DO  Authorized by: Millie Wellington DO     ECG reviewed by me, the ED Provider: yes    Patient location:  ED  Previous ECG:     Previous ECG:  Unavailable  Rate:     ECG rate:  103    ECG rate assessment: tachycardic    Rhythm:     Rhythm: sinus tachycardia    Ectopy:     Ectopy: none    QRS:     QRS axis:  Normal    QRS intervals:  Normal  Conduction:     Conduction: normal    ST segments:     ST segments:  Normal  T waves:     T waves: normal               ED Course  ED Course as of Jul 31 2250   Sat Jul 31, 2021 2136 No abdominal ascites seen on CT scan therefore will be unable to do any kind of diagnostic paracentesis  Given the degree of distension, his dyspnea I will recommend observation for GI consultation  2152 Updated patient about results thus far    I did recommend admission as he is still having a lot of pain and pressure in his abdomen and patient is agreeable  Will recommend GI consultation in the morning  Patient does admit to daily alcohol use, last drink was earlier today  He does not feel shaky but is a little anxious so will give oral Ativan and watch patient for symptoms of withdrawal       2218 Although lactic acid elevated and patient tachycardic, there is no infectious source identified  Low suspicion for sepsis  [address] checked fingerstick glucose and it is 31  Will give full 50 mL amp of D50 and have patient drink juice  Reassessed and he is diaphoretic but mentating normally  He states he was fine 15 minutes ago and then when he returned from the bathroom he started feeling lightheaded and broke out into a sweat  2228 Patient states he has not eaten a solid meal in over 26 hours  He is currently eating and drinking juice and is status post D50 bolus  Will continue to monitor glucose  SBIRT 20yo+      Most Recent Value   SBIRT (22 yo +)   In order to provide better care to our patients, we are screening all of our patients for alcohol and drug use  Would it be okay to ask you these screening questions? Yes Filed at: 07/31/2021 6132   Initial Alcohol Screen: US AUDIT-C    1  How often do you have a drink containing alcohol? 1 Filed at: 07/31/2021 1937   3a  Male UNDER 65: How often do you have five or more drinks on one occasion? 1 Filed at: 07/31/2021 1937   Audit-C Score  2 Filed at: 07/31/2021 8571   ANJU: How many times in the past year have you    Used an illegal drug or used a prescription medication for non-medical reasons?   Never Filed at: 07/31/2021 Vilma Sesay' Criteria for PE      Most Recent Value   Elijah' Criteria for PE   Clinical signs and symptoms of DVT  0 Filed at: 07/31/2021 2217   PE is primary diagnosis or equally likely  3 Filed at: 07/31/2021 2217   HR >100  1 5 Filed at: 07/31/2021 2217   Immobilization at least 3 days or Surgery in the previous 4 weeks  1 5 Filed at: 07/31/2021 2217   Previous, objectively diagnosed PE or DVT  0 Filed at: 07/31/2021 2217   Hemoptysis  0 Filed at: 07/31/2021 2217   Malignancy with treatment within 6 months or palliative  0 Filed at: 07/31/2021 2217   Moreno Iqbal' Criteria Total  6 Filed at: 07/31/2021 2217                MDM  Number of Diagnoses or Management Options  Diagnosis management comments: 69-year-old male presents to the ED with progressively worsening abdominal status tension and pressure the past several months, acutely worsening more recently  He also reports poor appetite, weight gain, dyspnea  Most likely patient has cirrhosis with portal hypertension and ascites however intra-abdominal mass or cancer also considered  Other etiologies including new renal failure, PE, pancreatitis, bowel obstruction also considered  Will workup broadly with cardiac, septic and abdominal labs  Will obtain chest x-ray, CTA chest with abdomen and pelvis CT  Will give small fluid bolus  If there is significant ascites will attempt diagnostic paracentesis to rule out SBP but overall very low suspicion for this         Amount and/or Complexity of Data Reviewed  Clinical lab tests: ordered and reviewed  Tests in the radiology section of CPT®: ordered and reviewed  Tests in the medicine section of CPT®: reviewed and ordered  Independent visualization of images, tracings, or specimens: yes        Disposition  Final diagnoses:   Abdominal distension   Abdominal pain   Cirrhosis of liver without ascites (HCC)   Liver lesion, left lobe   Cholelithiasis without cholecystitis   Dyspnea   Alcohol abuse   Elevated lactic acid level   Hypoglycemic episode in patient with diabetes mellitus ([facility] [address] )     Time reflects when diagnosis was documented in both MDM as applicable and the Disposition within this note     Time User Action Codes Description Comment    7/31/2021 10:02 PM Veto Judge WONG Add [R14 0] Abdominal distension 7/31/2021 10:03 PM Marget Arun E Add [R10 9] Abdominal pain     7/31/2021 10:03 PM Marget Arun E Add [K74 60] Cirrhosis of liver without ascites ([facility] [address] )     7/31/2021 10:03 PM Marget Arun E Add [K76 9] Liver lesion, left lobe     7/31/2021 10:03 PM Marget Arun E Add [K80 20] Cholelithiasis without cholecystitis     7/31/2021 10:03 PM Marget Arun E Add [R06 00] Dyspnea     7/31/2021 10:03 PM Marget Arun E Add [F10 10] Alcohol abuse     7/31/2021 10:04 PM Marget Arun E Add [R79 89] Elevated lactic acid level     7/31/2021 10:30 PM Marget Arun E Add [E11 649] Hypoglycemic episode in patient with diabetes mellitus [facility])       ED Disposition     ED Disposition Condition Date/Time Comment    Admit Stable Sat Jul 31, 2021 10:04 PM Case was discussed with DAGOBERTO and the patient's admission status was agreed to be Admission Status: inpatient status to the service of Dr Ty Joyce   Follow-up Information    None         Patient's Medications    No medications on file     No discharge procedures on file      PDMP Review     None          ED Provider  Electronically Signed by           Rosalba Owens DO  07/31/21 2218       Rosalba Owens DO  07/31/21 2230       Rosalba Owens, DO  07/31/21 2256

## 2021-08-01 NOTE — ASSESSMENT & PLAN NOTE
· Drinks Tha's Hard lemonade 6-10 20 oz cans daily  · Placed on CIWA protocol  · Initiate Librium taper  · Case management consult to help with resources for possible rehab down in [place]

## 2021-08-01 NOTE — H&P
[address] 1980, 42 y o  male MRN: [id number]  Unit/Bed#: ED 28 Encounter: [id number]  Primary Care Provider: No primary care provider on file  Date and time admitted to hospital: 7/31/2021  0:55 PM    * Alcoholic cirrhosis of liver without ascites (HCC)  Assessment & Plan  · CT a negative for PE, negative for ascites but revealing cirrhosis of the liver, multiple hemangiomas and a hypodense lesion  · Appreciate GI consult due to severe abdominal distension  · Liver enzymes slightly elevated, avoid hepatotoxic agents    Hepatic lesion  Assessment & Plan  · Noted on CT a  · Discussed close follow-up back in [place] with GI  · Appreciate GI consult here  · Discussed with patient about follow-up for MRI abdomen    Alcohol use disorder, severe, dependence ([facility] )  Assessment & Plan  · Drinks Tha's Hard lemonade 6-10 20 oz cans daily  · Placed on CIWA protocol  · Initiate Librium taper  · Case management consult to help with resources for possible rehab down in [place]    SIRS (systemic inflammatory response syndrome) ([place] [address] )  Assessment & Plan  · Meeting criteria due to tachycardia which has resolved and tachypnea most likely in the setting of abdominal pain  · No clear infectious source will hold off on antibiotics, BP stable so will hold off on IV fluids, given some in ED  · Blood culture x2 pending  · Lactic acid initially elevated 2 6, repeat WNL at 1 9    Diabetes mellitus type 1 ([facility] )  Assessment & Plan  No results found for: HGBA1C    Recent Labs     07/31/21  2219 07/31/21  2304   POCGLU 31* 96       Blood Sugar Average: Last 72 hrs:  (P) 63 5   · Blood glucose checks q i d , hypoglycemia protocol  · Patient noted initially with blood glucose 168, decreased to 31 and now 96 after D50  · Closely monitor blood glucose but will resume home Lantus 35 units in a m  And 40 units q h s    · Sliding scale insulin      VTE Pharmacologic Prophylaxis: VTE Score: 3 Moderate Risk (Score 3-4) - Pharmacological DVT Prophylaxis Ordered: enoxaparin (Lovenox)  Code Status: Level 1 - Full Code per patient  Discussion with family: Updated  (father) at bedside  Anticipated Length of Stay: Patient will be admitted on an inpatient basis with an anticipated length of stay of greater than 2 midnights secondary to See above  Total Time for Visit, including Counseling / Coordination of Care: 70 minutes Greater than 50% of this total time spent on direct patient counseling and coordination of care  Chief Complaint:     Chief Complaint   Patient presents with    Abdominal Pain     Patient reports abdominal pain for the last several months  Worsening today  Also has back pain  Denies n/v         History of Present Illness:  Sammy Lugo is a 42 y o  male with a PMH of diabetes mellitus type 1, GERD, obesity, alcohol use disorder who presents with complaint of gradual worsening of chronic abdominal distension pain  Admits for almost 6 months he has noticed abdominal pain intermittently and abdominal distension  Admits to a 20 lb weight gain in the last 2 months  Also admits to poor appetite  However, pain especially on the left side has been progressively worse  Admits to occasional intermittent sharp stabbing pains  Admits to drinking alcohol heavily and drinks about 6-10 20 oz cans of liquor daily  Denies chest pain  Does admit to shortness of breath and struggles with gait due to increased weight       Review of Systems:  Review of Systems   Constitutional: Positive for activity change and appetite change  Respiratory: Positive for shortness of breath  Cardiovascular: Negative for chest pain  Gastrointestinal: Positive for abdominal distention, abdominal pain and diarrhea  Negative for nausea and vomiting  Neurological: Negative for dizziness and headaches         Past Medical and Surgical History:   Past Medical History:   Diagnosis Date    Diabetes mellitus ([facility] )     GERD (gastroesophageal reflux disease)     Hypertension        History reviewed  No pertinent surgical history  Meds/Allergies:  Prior to Admission medications    Medication Sig Start Date End Date Taking? Authorizing Provider   cholecalciferol (VITAMIN D3) 400 units tablet Take 400 Units by mouth daily   Yes Historical Provider, MD   dicyclomine (BENTYL) 20 mg tablet Take 20 mg by mouth 3 (three) times a day   Yes Historical Provider, MD   furosemide (LASIX) 20 mg tablet Take 20 mg by mouth daily   Yes Historical Provider, MD   insulin aspart (NovoLOG) 100 units/mL injection Inject under the skin Sliding scale insulin   Yes Historical Provider, MD   insulin glargine (LANTUS) 100 units/mL subcutaneous injection Inject under the skin 35 units in a m  And 40 units q h s  Yes Historical Provider, MD   lisinopril (ZESTRIL) 20 mg tablet Take 20 mg by mouth 2 (two) times a day   Yes Historical Provider, MD   multivitamin (THERAGRAN) TABS Take 1 tablet by mouth daily   Yes Historical Provider, MD   pantoprazole (PROTONIX) 40 mg tablet Take 40 mg by mouth daily   Yes Historical Provider, MD     I have reviewed home medications with patient personally  Allergies: Allergies   Allergen Reactions    Shellfish-Derived Products - Food Allergy Hives       Social History:  Marital Status: Single   Occupation:  [employer]  Patient Pre-hospital Living Situation: Home in [place]  Patient Pre-hospital Level of Mobility: walks  Patient Pre-hospital Diet Restrictions:  Diabetic  Substance Use History:   Social History     Substance and Sexual Activity   Alcohol Use Yes     Social History     Tobacco Use   Smoking Status Never Smoker   Smokeless Tobacco Current User    Types: Chew     Social History     Substance and Sexual Activity   Drug Use Not Currently    Types: Marijuana       Family History:  History reviewed  No pertinent family history      Physical Exam:     Vitals:   Blood Pressure: 150/99 (07/31/21 2322)  Pulse: 81 (07/31/21 2322)  Temperature: 98 1 °F (36 7 °C) (07/31/21 1904)  Temp Source: Temporal (07/31/21 1904)  Respirations: 20 (07/31/21 2315)  Height: 5' 8" (172 7 cm) (07/31/21 1904)  Weight - Scale: 123 kg (272 lb) (07/31/21 1904)  SpO2: 96 % (07/31/21 2315)    Physical Exam  Vitals and nursing note reviewed  Constitutional:       General: He is not in acute distress  Appearance: Normal appearance  He is obese  He is not diaphoretic  HENT:      Head: Normocephalic  Cardiovascular:      Rate and Rhythm: Normal rate and regular rhythm  Heart sounds: Normal heart sounds  Pulmonary:      Effort: Pulmonary effort is normal  No respiratory distress  Breath sounds: Normal breath sounds  No wheezing or rales  Abdominal:      General: Bowel sounds are normal  There is distension  Tenderness: There is abdominal tenderness in the epigastric area and left upper quadrant  There is guarding  Comments: Abdomen is firm   Musculoskeletal:         General: No tenderness  Right lower leg: No edema  Left lower leg: No edema  Skin:     General: Skin is warm and dry  Neurological:      General: No focal deficit present  Mental Status: He is alert and oriented to person, place, and time  Psychiatric:         Mood and Affect: Mood normal          Behavior: Behavior normal          Thought Content:  Thought content normal          Judgment: Judgment normal          Additional Data:     Lab Results:  Results from last 7 days   Lab Units 07/31/21 2004   WBC Thousand/uL 9 41   HEMOGLOBIN g/dL 15 0   HEMATOCRIT % 45 5   PLATELETS Thousands/uL 254   NEUTROS PCT % 64   LYMPHS PCT % 26   MONOS PCT % 9   EOS PCT % 1     Results from last 7 days   Lab Units 07/31/21 2004   SODIUM mmol/L 138   POTASSIUM mmol/L 4 1   CHLORIDE mmol/L 98*   CO2 mmol/L 29   BUN mg/dL 9   CREATININE mg/dL 1 06   ANION GAP mmol/L 11   CALCIUM mg/dL 9 1   ALBUMIN g/dL 3 7   TOTAL BILIRUBIN mg/dL 0 99   ALK PHOS U/L 110   ALT U/L 94*   AST U/L 89*   GLUCOSE RANDOM mg/dL 161*     Results from last 7 days   Lab Units 07/31/21 2004   INR  1 06     Results from last 7 days   Lab Units 07/31/21  2304 07/31/21  2219   POC GLUCOSE mg/dl 96 31*         Results from last 7 days   Lab Units 07/31/21  2310 07/31/21 2004   LACTIC ACID mmol/L 1 9 2 6*       Imaging: Reviewed radiology reports from this admission including: abdominal/pelvic CT and Personally reviewed the following imaging: chest xray  PE Study with CT Abdomen and Pelvis with contrast   Final Result by Shashi Polo MD (07/31 2126)      There is no pulmonary embolism  The liver is cirrhotic  There is an indeterminate 0 9 x 0 8 cm hypodense lesion in the left hepatic lobe segment 2/3 (series 2 images 260 )  Recommended abdomen MRI with and without contrast on a nonemergent basis to characterize  Cholelithiasis without CT evidence of acute cholecystitis  There is no ascites to account for abdominal distention  Workstation performed: [id number]         XR chest 1 view portable   ED Interpretation by Alma Scanlon DO (07/31 2038)   No acute abnormality in the chest           EKG and Other Studies Reviewed on Admission:   · EKG: No EKG obtained  ** Please Note: This note has been constructed using a voice recognition system   **

## 2021-08-01 NOTE — PLAN OF CARE
Problem: METABOLIC, FLUID AND ELECTROLYTES - ADULT  Goal: Glucose maintained within target range  Description: INTERVENTIONS:  - Monitor Blood Glucose as ordered  - Assess for signs and symptoms of hyperglycemia and hypoglycemia  - Administer ordered medications to maintain glucose within target range  - Assess nutritional intake and initiate nutrition service referral as needed  Outcome: Progressing     Problem: PAIN - ADULT  Goal: Verbalizes/displays adequate comfort level or baseline comfort level  Description: Interventions:  - Encourage patient to monitor pain and request assistance  - Assess pain using appropriate pain scale  - Administer analgesics based on type and severity of pain and evaluate response  - Implement non-pharmacological measures as appropriate and evaluate response  - Consider cultural and social influences on pain and pain management  - Notify physician/advanced practitioner if interventions unsuccessful or patient reports new pain  Outcome: Progressing     Problem: DISCHARGE PLANNING  Goal: Discharge to home or other facility with appropriate resources  Description: INTERVENTIONS:  - Identify barriers to discharge w/patient and caregiver  - Arrange for needed discharge resources and transportation as appropriate  - Identify discharge learning needs (meds, wound care, etc )  - Arrange for interpretive services to assist at discharge as needed  - Refer to Case Management Department for coordinating discharge planning if the patient needs post-hospital services based on physician/advanced practitioner order or complex needs related to functional status, cognitive ability, or social support system  Outcome: Progressing     Problem: Knowledge Deficit  Goal: Patient/family/caregiver demonstrates understanding of disease process, treatment plan, medications, and discharge instructions  Description: Complete learning assessment and assess knowledge base    Interventions:  - Provide teaching at level of understanding  - Provide teaching via preferred learning methods  Outcome: Progressing

## 2021-08-01 NOTE — CONSULTS
Consultation -  [facility]  Verito Lujan 42 y o  male MRN: [id number]  Unit/Bed#: ED 28 Encounter: [id number]        Inpatient consult to gastroenterology  Consult performed by: Vitaliy Arreguin PA-C  Consult ordered by: Anil Quintero PA-C          Reason for Consult / Principal Problem: Alcohol cirrhosis, abdominal pain    HPI:  43-year-old male with a history of alcohol cirrhosis who resides in [place] is vacationing here presents with complaints of abdominal distension, pain and weakness  He reports that he was diagnosed with alcohol cirrhosis several months ago by a gastroenterologist to [place]  At that time he reports that he cut back on how much he was drinking  He states that he is currently drinking several beers a day  Prior to that he is not forthcoming with him which he was drinking but does reports that it was a lot  He reports that he was also diagnosed with hepatitis-C  He has a history of IV drug use 13 years ago  He has become concerned because over the past few months despite cutting back on alcohol he has had significant abdominal distention  He reports that his abdomen is so distended it is very uncomfortable and even painful at times  He notes that in the morning it is a little bit less distended but worsens throughout the day  He has had some lower extremity edema for which is family doctor started him on furosemide approximately 2 months ago  He reports that this has improved slightly  He has gained approximately 20 lb over the past few months  He is a type 1 diabetic maintained on insulin  He reports that his blood sugars have become very uncontrolled as well  He has on his own increased his basal insulin without improvement  He reports that over the past year has been suffering with diarrhea  He has a very large watery painful bowel movement in the morning and the same at night  He reports a very poor appetite and is only eating once a day  He has morning nausea  He had a CT scan in [place] which he reports showed a lot of stool in his colon  His gastroenterologist recommended a bowel cleanse which she did the week prior to leaving for [place]  He had a large amount of diarrhea with this and reports that his belly deflated slightly  Despite his nausea he has no vomiting  He has had no rectal bleeding, melena or hematemesis  He does have acid reflux for which he takes pantoprazole with good relief  He reports that he had upper endoscopy in [facility] about 2 months ago  He was diagnosed with H pylori at that time  He is status post antibiotic treatment  He does not remember which antibiotics he was put on  He has not yet had a test of cure  He reports he was told he had evidence of GERD but does not know what this means exactly  CT scan in the emergency room documented no evidence of ascites  He did have an indeterminate liver lesion for which follow-up MRI was recommended  REVIEW OF SYSTEMS:    CONSTITUTIONAL: Denies any fever, chills, or rigors  Good appetite, and no recent weight loss  HEENT: No earache or tinnitus  Denies hearing loss or visual disturbances  CARDIOVASCULAR: No chest pain or palpitations  RESPIRATORY: Denies any cough, hemoptysis, shortness of breath or dyspnea on exertion  GASTROINTESTINAL: As noted in the History of Present Illness  GENITOURINARY: No problems with urination  Denies any hematuria or dysuria  NEUROLOGIC: No dizziness or vertigo, denies headaches  MUSCULOSKELETAL: Denies any muscle or joint pain  SKIN: Denies skin rashes or itching  ENDOCRINE: Denies excessive thirst  Denies intolerance to heat or cold  PSYCHOSOCIAL: Denies depression or anxiety  Denies any recent memory loss  Historical Information   Past Medical History:   Diagnosis Date    Diabetes mellitus ([facility] )     GERD (gastroesophageal reflux disease)     Hypertension      History reviewed   No pertinent surgical history  Social History   Social History     Substance and Sexual Activity   Alcohol Use Yes     Social History     Substance and Sexual Activity   Drug Use Not Currently    Types: Marijuana     Social History     Tobacco Use   Smoking Status Never Smoker   Smokeless Tobacco Current User    Types: Chew     History reviewed  No pertinent family history      Meds/Allergies     (Not in a hospital admission)    Current Facility-Administered Medications   Medication Dose Route Frequency    aluminum-magnesium hydroxide-simethicone (MYLANTA) oral suspension 30 mL  30 mL Oral Q6H PRN    chlordiazePOXIDE (LIBRIUM) capsule 10 mg  10 mg Oral Q6H [address]    Followed by   Param Morgna ON 8/2/2021] chlordiazePOXIDE (LIBRIUM) capsule 10 mg  10 mg Oral Q8H [address]    Followed by   Param Morgan ON 8/3/2021] chlordiazePOXIDE (LIBRIUM) capsule 10 mg  10 mg Oral Q12H [address]    Followed by   Param Morgan ON 8/4/2021] chlordiazePOXIDE (LIBRIUM) capsule 10 mg  10 mg Oral HS    dicyclomine (BENTYL) tablet 20 mg  20 mg Oral TID PRN    docusate sodium (COLACE) capsule 100 mg  100 mg Oral BID    enoxaparin (LOVENOX) subcutaneous injection 40 mg  40 mg Subcutaneous Daily    folic acid (FOLVITE) tablet 1 mg  1 mg Oral Daily    furosemide (LASIX) tablet 20 mg  20 mg Oral Daily    insulin glargine (LANTUS) subcutaneous injection 35 Units 0 35 mL  35 Units Subcutaneous QAM    insulin glargine (LANTUS) subcutaneous injection 40 Units 0 4 mL  40 Units Subcutaneous HS    insulin lispro (HumaLOG) 100 units/mL subcutaneous injection 2-12 Units  2-12 Units Subcutaneous TID AC    insulin lispro (HumaLOG) 100 units/mL subcutaneous injection 2-12 Units  2-12 Units Subcutaneous HS    Lidocaine Viscous HCl (XYLOCAINE) 2 % mucosal solution 15 mL  15 mL Swish & Swallow 4x Daily PRN    lisinopril (ZESTRIL) tablet 20 mg  20 mg Oral BID    multivitamin-minerals (CENTRUM) tablet 1 tablet  1 tablet Oral Daily    pantoprazole (PROTONIX) EC tablet 40 mg  40 mg Oral Early Morning    polyethylene glycol (GLYCOLAX) bowel prep 238 g  238 g Oral Once    polyethylene glycol (MIRALAX) packet 17 g  17 g Oral Daily    thiamine tablet 100 mg  100 mg Oral Daily       Allergies   Allergen Reactions    Shellfish-Derived Products - Food Allergy Hives           Objective     Blood pressure 151/90, pulse 90, temperature 97 8 °F (36 6 °C), temperature source Oral, resp  rate 20, height 5' 8" (1 727 m), weight 123 kg (272 lb), SpO2 97 %  Intake/Output Summary (Last 24 hours) at 8/1/2021 1117  Last data filed at 8/1/2021 0858  Gross per 24 hour   Intake 500 ml   Output 800 ml   Net -300 ml         PHYSICAL EXAM:      General Appearance:   Alert, cooperative, no distress, appears stated age    HEENT:   Normocephalic, atraumatic, anicteric      Neck:  Supple, symmetrical, trachea midline, no adenopathy;    thyroid: no enlargement/tenderness/nodules; no carotid  bruit or JVD    Lungs:   Clear to auscultation bilaterally; no rales, rhonchi or wheezing; respirations unlabored    Heart[de-identified]   S1 and S2 normal; regular rate and rhythm; no murmur, rub, or gallop  Abdomen:   Distended  Tender to palpation diffusely  No rebound or guarding noted   No masses   Genitalia:   Deferred    Rectal:   Deferred    Extremities:  No cyanosis, clubbing or edema    Pulses:  2+ and symmetric all extremities    Skin:  Skin color, texture, turgor normal, no rashes or lesions    Lymph nodes:  No palpable cervical, axillary or inguinal lymphadenopathy        Lab Results:   Results from last 7 days   Lab Units 07/31/21 2004   WBC Thousand/uL 9 41   HEMOGLOBIN g/dL 15 0   HEMATOCRIT % 45 5   PLATELETS Thousands/uL 254   NEUTROS PCT % 64   LYMPHS PCT % 26   MONOS PCT % 9   EOS PCT % 1     Results from last 7 days   Lab Units 07/31/21 2004   POTASSIUM mmol/L 4 1   CHLORIDE mmol/L 98*   CO2 mmol/L 29   BUN mg/dL 9   CREATININE mg/dL 1 06   CALCIUM mg/dL 9 1   ALK PHOS U/L 110   ALT U/L 94*   AST U/L 89*     Results from last 7 days   Lab Units 07/31/21 2004   INR  1 06     Results from last 7 days   Lab Units 07/31/21 2004   LIPASE u/L 66*       Imaging Studies: I have personally reviewed pertinent imaging studies  PE Study with CT Abdomen and Pelvis with contrast    Result Date: 7/31/2021  Impression: There is no pulmonary embolism  The liver is cirrhotic  There is an indeterminate 0 9 x 0 8 cm hypodense lesion in the left hepatic lobe segment 2/3 (series 2 images 260 )  Recommended abdomen MRI with and without contrast on a nonemergent basis to characterize  Cholelithiasis without CT evidence of acute cholecystitis  There is no ascites to account for abdominal distention  Workstation performed: [id number]       ASSESSMENT and PLAN:      ETOH/HCV cirrhosis  - complete ETOH cessation  - DF: 9 2  - MELD -8  HE: none  Varices: will plan EGD but patient reports none on previous exam  Ascites: none on imaging  HCC: indeterminate lesion on CT - will f/u MRI and AFP  ETOH: needs complete cessation  - Thiamine, folic acid  - Monitor for signs of withdrawal  - Improved nutrition - he needs to lose weight and control his blood sugars  HCV: will check labs; if active virus he should pursue treatment with his GI in [place]    Abdominal pain and distension  Diarrhea  Nausea  Poor appetite  - EGD and Colonoscopy tomorrow  - Continue Dicyclomine   - Continue Pantoprazole  - ETOH cessation    Indeterminate liver lesion on CT  - F/U MRI  - F/U AFP     Patient was seen and examined by Dr Ericka Gupta  All fuller medical decisions were made by Dr Ericka Gupta  Thank you for allowing us to participate in the care of this present patient  We will follow-up with you closely

## 2021-08-02 ENCOUNTER — TELEPHONE (OUTPATIENT)
Dept: GASTROENTEROLOGY | Facility: HOSPITAL | Age: 41
End: 2021-08-02

## 2021-08-02 ENCOUNTER — ANESTHESIA EVENT (INPATIENT)
Dept: GASTROENTEROLOGY | Facility: HOSPITAL | Age: 41
DRG: 280 | End: 2021-08-02
Payer: COMMERCIAL

## 2021-08-02 ENCOUNTER — ANESTHESIA (INPATIENT)
Dept: GASTROENTEROLOGY | Facility: HOSPITAL | Age: 41
DRG: 280 | End: 2021-08-02
Payer: COMMERCIAL

## 2021-08-02 ENCOUNTER — APPOINTMENT (OUTPATIENT)
Dept: GASTROENTEROLOGY | Facility: HOSPITAL | Age: 41
DRG: 280 | End: 2021-08-02
Payer: COMMERCIAL

## 2021-08-02 LAB
GLUCOSE SERPL-MCNC: 214 MG/DL (ref 65–140)
GLUCOSE SERPL-MCNC: 219 MG/DL (ref 65–140)
GLUCOSE SERPL-MCNC: 244 MG/DL (ref 65–140)
GLUCOSE SERPL-MCNC: 286 MG/DL (ref 65–140)
GLUCOSE SERPL-MCNC: 289 MG/DL (ref 65–140)
GLUCOSE SERPL-MCNC: 291 MG/DL (ref 65–140)
HAV IGM SER QL: ABNORMAL
HBV CORE IGM SER QL: ABNORMAL
HBV SURFACE AG SER QL: ABNORMAL
HCV AB SER QL: ABNORMAL
PROCALCITONIN SERPL-MCNC: 0.19 NG/ML

## 2021-08-02 PROCEDURE — 94660 CPAP INITIATION&MGMT: CPT

## 2021-08-02 PROCEDURE — 88305 TISSUE EXAM BY PATHOLOGIST: CPT | Performed by: PATHOLOGY

## 2021-08-02 PROCEDURE — 82948 REAGENT STRIP/BLOOD GLUCOSE: CPT

## 2021-08-02 PROCEDURE — 84145 PROCALCITONIN (PCT): CPT | Performed by: EMERGENCY MEDICINE

## 2021-08-02 PROCEDURE — 43239 EGD BIOPSY SINGLE/MULTIPLE: CPT | Performed by: INTERNAL MEDICINE

## 2021-08-02 PROCEDURE — 0DBN8ZX EXCISION OF SIGMOID COLON, VIA NATURAL OR ARTIFICIAL OPENING ENDOSCOPIC, DIAGNOSTIC: ICD-10-PCS | Performed by: FAMILY MEDICINE

## 2021-08-02 PROCEDURE — 45380 COLONOSCOPY AND BIOPSY: CPT | Performed by: INTERNAL MEDICINE

## 2021-08-02 PROCEDURE — 0DBK8ZX EXCISION OF ASCENDING COLON, VIA NATURAL OR ARTIFICIAL OPENING ENDOSCOPIC, DIAGNOSTIC: ICD-10-PCS | Performed by: FAMILY MEDICINE

## 2021-08-02 PROCEDURE — 0DB98ZX EXCISION OF DUODENUM, VIA NATURAL OR ARTIFICIAL OPENING ENDOSCOPIC, DIAGNOSTIC: ICD-10-PCS | Performed by: FAMILY MEDICINE

## 2021-08-02 PROCEDURE — 99233 SBSQ HOSP IP/OBS HIGH 50: CPT | Performed by: FAMILY MEDICINE

## 2021-08-02 PROCEDURE — 0DB68ZX EXCISION OF STOMACH, VIA NATURAL OR ARTIFICIAL OPENING ENDOSCOPIC, DIAGNOSTIC: ICD-10-PCS | Performed by: FAMILY MEDICINE

## 2021-08-02 PROCEDURE — 94760 N-INVAS EAR/PLS OXIMETRY 1: CPT

## 2021-08-02 RX ORDER — PROPOFOL 10 MG/ML
INJECTION, EMULSION INTRAVENOUS AS NEEDED
Status: DISCONTINUED | OUTPATIENT
Start: 2021-08-02 | End: 2021-08-02

## 2021-08-02 RX ORDER — INSULIN GLARGINE 100 [IU]/ML
40 INJECTION, SOLUTION SUBCUTANEOUS EVERY MORNING
Status: DISCONTINUED | OUTPATIENT
Start: 2021-08-03 | End: 2021-08-03 | Stop reason: HOSPADM

## 2021-08-02 RX ORDER — SODIUM CHLORIDE, SODIUM LACTATE, POTASSIUM CHLORIDE, CALCIUM CHLORIDE 600; 310; 30; 20 MG/100ML; MG/100ML; MG/100ML; MG/100ML
INJECTION, SOLUTION INTRAVENOUS CONTINUOUS PRN
Status: DISCONTINUED | OUTPATIENT
Start: 2021-08-02 | End: 2021-08-02

## 2021-08-02 RX ORDER — LIDOCAINE HYDROCHLORIDE 10 MG/ML
INJECTION, SOLUTION EPIDURAL; INFILTRATION; INTRACAUDAL; PERINEURAL AS NEEDED
Status: DISCONTINUED | OUTPATIENT
Start: 2021-08-02 | End: 2021-08-02

## 2021-08-02 RX ORDER — INSULIN GLARGINE 100 [IU]/ML
44 INJECTION, SOLUTION SUBCUTANEOUS
Status: DISCONTINUED | OUTPATIENT
Start: 2021-08-02 | End: 2021-08-03 | Stop reason: HOSPADM

## 2021-08-02 RX ADMIN — NICOTINE 14 MG: 14 PATCH, EXTENDED RELEASE TRANSDERMAL at 10:13

## 2021-08-02 RX ADMIN — INSULIN LISPRO 4 UNITS: 100 INJECTION, SOLUTION INTRAVENOUS; SUBCUTANEOUS at 18:35

## 2021-08-02 RX ADMIN — INSULIN GLARGINE 35 UNITS: 100 INJECTION, SOLUTION SUBCUTANEOUS at 10:10

## 2021-08-02 RX ADMIN — PANTOPRAZOLE SODIUM 40 MG: 40 TABLET, DELAYED RELEASE ORAL at 06:05

## 2021-08-02 RX ADMIN — PROPOFOL 100 MG: 10 INJECTION, EMULSION INTRAVENOUS at 15:07

## 2021-08-02 RX ADMIN — MULTIPLE VITAMINS W/ MINERALS TAB 1 TABLET: TAB ORAL at 10:11

## 2021-08-02 RX ADMIN — CHLORDIAZEPOXIDE HYDROCHLORIDE 10 MG: 10 CAPSULE ORAL at 02:53

## 2021-08-02 RX ADMIN — MELATONIN TAB 3 MG 6 MG: 3 TAB at 21:05

## 2021-08-02 RX ADMIN — INSULIN LISPRO 4 UNITS: 100 INJECTION, SOLUTION INTRAVENOUS; SUBCUTANEOUS at 21:06

## 2021-08-02 RX ADMIN — LIDOCAINE HYDROCHLORIDE 100 MG: 10 INJECTION, SOLUTION EPIDURAL; INFILTRATION; INTRACAUDAL; PERINEURAL at 15:07

## 2021-08-02 RX ADMIN — SODIUM CHLORIDE, SODIUM LACTATE, POTASSIUM CHLORIDE, AND CALCIUM CHLORIDE: .6; .31; .03; .02 INJECTION, SOLUTION INTRAVENOUS at 15:03

## 2021-08-02 RX ADMIN — LISINOPRIL 20 MG: 20 TABLET ORAL at 10:11

## 2021-08-02 RX ADMIN — THIAMINE HCL TAB 100 MG 100 MG: 100 TAB at 10:11

## 2021-08-02 RX ADMIN — CHLORDIAZEPOXIDE HYDROCHLORIDE 10 MG: 10 CAPSULE ORAL at 10:11

## 2021-08-02 RX ADMIN — DOCUSATE SODIUM 100 MG: 100 CAPSULE, LIQUID FILLED ORAL at 10:12

## 2021-08-02 RX ADMIN — PROPOFOL 20 MG: 10 INJECTION, EMULSION INTRAVENOUS at 15:15

## 2021-08-02 RX ADMIN — PROPOFOL 50 MG: 10 INJECTION, EMULSION INTRAVENOUS at 15:08

## 2021-08-02 RX ADMIN — LISINOPRIL 20 MG: 20 TABLET ORAL at 21:05

## 2021-08-02 RX ADMIN — PROPOFOL 30 MG: 10 INJECTION, EMULSION INTRAVENOUS at 15:21

## 2021-08-02 RX ADMIN — ENOXAPARIN SODIUM 40 MG: 40 INJECTION SUBCUTANEOUS at 10:11

## 2021-08-02 RX ADMIN — PROPOFOL 50 MG: 10 INJECTION, EMULSION INTRAVENOUS at 15:12

## 2021-08-02 RX ADMIN — FOLIC ACID 1 MG: 1 TABLET ORAL at 10:11

## 2021-08-02 RX ADMIN — PROPOFOL 50 MG: 10 INJECTION, EMULSION INTRAVENOUS at 15:10

## 2021-08-02 RX ADMIN — CHLORDIAZEPOXIDE HYDROCHLORIDE 10 MG: 10 CAPSULE ORAL at 18:33

## 2021-08-02 RX ADMIN — PROPOFOL 30 MG: 10 INJECTION, EMULSION INTRAVENOUS at 15:18

## 2021-08-02 RX ADMIN — INSULIN GLARGINE 44 UNITS: 100 INJECTION, SOLUTION SUBCUTANEOUS at 21:05

## 2021-08-02 RX ADMIN — FUROSEMIDE 20 MG: 40 TABLET ORAL at 10:11

## 2021-08-02 NOTE — PROGRESS NOTES
Pt PETRA for 3pm vitals and CIWA at procedure  Pt returned to unit presently  Will do vitals and CIWA now   Will continue to monitor

## 2021-08-02 NOTE — UTILIZATION REVIEW
Inpatient Admission Authorization Request   NOTIFICATION OF INPATIENT ADMISSION/INPATIENT AUTHORIZATION REQUEST   SERVICING FACILITY:   [address] [place] [address]  Tax ID: [id number]  NPI: [id number]  Place of Service: Inpatient [facility]  [address]  Place of Service Code: 24     ATTENDING PROVIDER:  Attending Name and NPI#: Chitra Fournier Md [[id number]]  Address: [address] [place] [address]  Phone: [phone]     UTILIZATION REVIEW CONTACT:  Ulanda Epley, Utilization   Network Utilization Review Department  Phone: [phone]  Fax [phone]  Email: [email or web address]     PHYSICIAN ADVISORY SERVICES:  FOR [id number] REVIEW - MEDICAL NECESSITY DENIAL  Phone: [phone]  Fax: [phone]  Email: [email or web address]     TYPE OF REQUEST:  Inpatient Status     ADMISSION INFORMATION:  ADMISSION DATE/TIME: 7/31/21 10:04 PM  PATIENT DIAGNOSIS CODE/DESCRIPTION:  Alcohol abuse [F10 10]  Abdominal distension [R14 0]  Back pain [M54 9]  Dyspnea [R06 00]  Abdominal pain [R10 9]  Liver lesion, left lobe [K76 9]  Elevated lactic acid level [R79 89]  Cholelithiasis without cholecystitis [K80 20]  Cirrhosis of liver without ascites (HCC) [K74 60]  Diarrhea, unspecified type [R19 7]  Hypoglycemic episode in patient with diabetes mellitus ([facility] ) [E11 649]  Gastroesophageal reflux disease, unspecified whether esophagitis present [K21 9]  DISCHARGE DATE/TIME: No discharge date for patient encounter  DISCHARGE DISPOSITION (IF DISCHARGED): Final discharge disposition not confirmed     IMPORTANT INFORMATION:  Please contact the Ulanda Epley directly with any questions or concerns regarding this request  Department voicemails are confidential     Send requests for admission clinical reviews, concurrent reviews, approvals, and administrative denials due to lack of clinical to fax [phone]

## 2021-08-02 NOTE — ANESTHESIA POSTPROCEDURE EVALUATION
Post-Op Assessment Note    CV Status:  Stable  Pain Score: 0    Pain management: adequate     Mental Status:  Sleepy and arousable   Hydration Status:  Stable   PONV Controlled:  None   Airway Patency:  Patent and adequate      Post Op Vitals Reviewed: Yes      Staff: CRNA         No complications documented      /68 (08/02/21 1532)    Temp (!) 97 4 °F (36 3 °C) (08/02/21 1532)    Pulse 88 (08/02/21 1532)   Resp 13 (08/02/21 1532)    SpO2 99 % (08/02/21 1532)

## 2021-08-02 NOTE — PLAN OF CARE
Problem: Potential for Falls  Goal: Patient will remain free of falls  Description: INTERVENTIONS:  - Educate patient/family on patient safety including physical limitations  - Instruct patient to call for assistance with activity   - Consult OT/PT to assist with strengthening/mobility   - Keep Call bell within reach  - Keep bed low and locked with side rails adjusted as appropriate  - Keep care items and personal belongings within reach  - Initiate and maintain comfort rounds  - Make Fall Risk Sign visible to staff  - Pt is continent and toilets self independently    - Apply yellow socks and bracelet for high fall risk patients  - Consider moving patient to room near nurses station  Outcome: Progressing     Problem: METABOLIC, FLUID AND ELECTROLYTES - ADULT  Goal: Glucose maintained within target range  Description: INTERVENTIONS:  - Monitor Blood Glucose as ordered  - Assess for signs and symptoms of hyperglycemia and hypoglycemia  - Administer ordered medications to maintain glucose within target range  - Assess nutritional intake and initiate nutrition service referral as needed  Outcome: Progressing     Problem: PAIN - ADULT  Goal: Verbalizes/displays adequate comfort level or baseline comfort level  Description: Interventions:  - Encourage patient to monitor pain and request assistance  - Assess pain using appropriate pain scale  - Administer analgesics based on type and severity of pain and evaluate response  - Implement non-pharmacological measures as appropriate and evaluate response  - Consider cultural and social influences on pain and pain management  - Notify physician/advanced practitioner if interventions unsuccessful or patient reports new pain  Outcome: Progressing     Problem: SAFETY ADULT  Goal: Patient will remain free of falls  Description: INTERVENTIONS:  - Educate patient/family on patient safety including physical limitations  - Instruct patient to call for assistance with activity   - Consult OT/PT to assist with strengthening/mobility   - Keep Call bell within reach  - Keep bed low and locked with side rails adjusted as appropriate  - Keep care items and personal belongings within reach  - Initiate and maintain comfort rounds  - Make Fall Risk Sign visible to staff  - Pt is continent and toilets self independently  - Apply yellow socks and bracelet for high fall risk patients  - Consider moving patient to room near nurses station  Outcome: Progressing     Problem: DISCHARGE PLANNING  Goal: Discharge to home or other facility with appropriate resources  Description: INTERVENTIONS:  - Identify barriers to discharge w/patient and caregiver  - Arrange for needed discharge resources and transportation as appropriate  - Identify discharge learning needs (meds, wound care, etc )  - Arrange for interpretive services to assist at discharge as needed  - Refer to Case Management Department for coordinating discharge planning if the patient needs post-hospital services based on   physician/advanced practitioner order or complex needs related to functional status, cognitive ability, or social support system  Outcome: Progressing     Problem: Knowledge Deficit  Goal: Patient/family/caregiver demonstrates understanding of disease process, treatment plan, medications, and discharge instructions  Description: Complete learning assessment and assess knowledge base    Interventions:  - Provide teaching at level of understanding  - Provide teaching via preferred learning methods  Outcome: Progressing

## 2021-08-02 NOTE — PROGRESS NOTES
[address] [facility]  Progress Note - Moise Arceo 1980, 42 y o  male MRN: [id number]  Unit/Bed#: -Zoltan Encounter: [id number]  Primary Care Provider: No primary care provider on file     Date and time admitted to hospital: 7/31/2021  0:48 PM    * Alcoholic cirrhosis of liver without ascites ([facility] )  Assessment & Plan  · For EGD colonoscopy today      · CT a negative for PE, negative for ascites but revealing cirrhosis of the liver, multiple hemangiomas and a hypodense lesion  · Appreciate GI consult due to severe abdominal distension  · Liver enzymes slightly elevated, avoid hepatotoxic agents    Alcohol use disorder, severe, dependence (HCC)  Assessment & Plan  · No signs of withdrawals currently  ·   ·  Drinks Tha's Hard lemonade 6-10 20 oz cans daily  · Placed on CIWA protocol  · Initiate Librium taper  · Case management consult to help with resources for possible rehab down in [place]    Liver lesion, left lobe  Assessment & Plan  · MRI shows benign hepatic hemangiomata    SIRS (systemic inflammatory response syndrome) ([facility] )  Assessment & Plan  · Meeting criteria due to tachycardia which has resolved and tachypnea most likely in the setting of abdominal pain  · No clear infectious source will hold off on antibiotics, BP stable so will hold off on IV fluids, given some in ED  · Blood culture x2 pending  · Lactic acid initially elevated 2 6, repeat WNL at 1 9    Diabetes mellitus type 1 [facility])  Assessment & Plan  No results found for: HGBA1C    Recent Labs     08/01/21  1951 08/01/21  2104 08/02/21  0713 08/02/21  1111   POCGLU 359* 332* 219* 291*       Blood Sugar Average: Last 72 hrs:  (P) 669 [id number]   · Blood glucose checks q i d , hypoglycemia protocol  · Patient noted initially with blood glucose 168, decreased to 31 and now 96 after D50  · Closely monitor blood glucose but will resume home increase Lantus  · Sliding scale insulin        VTE Pharmacologic Prophylaxis: VTE Score: 3 Moderate Risk (Score 3-4) - Pharmacological DVT Prophylaxis Ordered: enoxaparin (Lovenox)  Patient Centered Rounds: I performed bedside rounds with nursing staff today  Discussions with Specialists or Other Care Team Provider:  Yes, GI    Education and Discussions with Family / Patient: Patient has been updated thoroughly  Patient to update his family  Time Spent for Care: 20 minutes  More than 50% of total time spent on counseling and coordination of care as described above  Current Length of Stay: 2 day(s)  Current Patient Status: Inpatient   Certification Statement: The patient will continue to require additional inpatient hospital stay due to Need for abdominal pain resolution and EGD and colonoscopy  Discharge Plan: Anticipate discharge in 24-48 hrs to home  Code Status: Level 1 - Full Code    Subjective:   Seen and examined at bedside  For EGD colonoscopy today  Abdominal pain has improved significantly    Objective:     Vitals:   Temp (24hrs), Av 9 °F (36 6 °C), Min:97 3 °F (36 3 °C), Max:98 5 °F (36 9 °C)    Temp:  [97 3 °F (36 3 °C)-98 5 °F (36 9 °C)] 97 6 °F (36 4 °C)  HR:  [] 85  Resp:  [18-20] 18  BP: (111-157)/(61-91) 128/61  SpO2:  [92 %-99 %] 97 %  Body mass index is 41 36 kg/m²  Input and Output Summary (last 24 hours):      Intake/Output Summary (Last 24 hours) at 2021 1429  Last data filed at 2021 2215  Gross per 24 hour   Intake 480 ml   Output --   Net 480 ml       Physical Exam:   Physical Exam General- Awake, alert and oriented x 3, looks comfortable  HEENT- Normocephalic, atraumatic, oral mucosa- moist  Neck- Supple, No carotid bruit, no JVD  CVS- Normal S1/ S2, Regular rate and rhythm, No murmur, No edema  Respiratory system- B/L clear breath sounds, no wheezing  Abdomen- Soft, tender and distended,tenderness, Bowel sound- present 4 quads  Genitourinary- No suprapubic tenderness, No CVA tenderness  Skin- No new bruise or rash  Musculoskeletal- No gross deformity  Psych- No acute psychosis  CNS- CN II- XII grossly intact, No acute focal neurologic deficit noted      Additional Data:     Labs:  Results from last 7 days   Lab Units 07/31/21 2004   WBC Thousand/uL 9 41   HEMOGLOBIN g/dL 15 0   HEMATOCRIT % 45 5   PLATELETS Thousands/uL 254   NEUTROS PCT % 64   LYMPHS PCT % 26   MONOS PCT % 9   EOS PCT % 1     Results from last 7 days   Lab Units 07/31/21 2004   SODIUM mmol/L 138   POTASSIUM mmol/L 4 1   CHLORIDE mmol/L 98*   CO2 mmol/L 29   BUN mg/dL 9   CREATININE mg/dL 1 06   ANION GAP mmol/L 11   CALCIUM mg/dL 9 1   ALBUMIN g/dL 3 7   TOTAL BILIRUBIN mg/dL 0 99   ALK PHOS U/L 110   ALT U/L 94*   AST U/L 89*   GLUCOSE RANDOM mg/dL 161*     Results from last 7 days   Lab Units 07/31/21 2004   INR  1 06     Results from last 7 days   Lab Units 08/02/21  1359 08/02/21  1111 08/02/21  0713 08/01/21  2104 08/01/21  1951 08/01/21  1611 08/01/21  1150 08/01/21  0801 07/31/21  2304 07/31/21  2219   POC GLUCOSE mg/dl 289* 291* 219* 332* 359* 353* 331* 313* 96 31*         Results from last 7 days   Lab Units 08/02/21  0441 07/31/21  2310 07/31/21 2004   LACTIC ACID mmol/L  --  1 9 2 6*   PROCALCITONIN ng/ml 0 19  --  0 07       Lines/Drains:  Invasive Devices     Peripheral Intravenous Line            Peripheral IV 07/31/21 Distal;Right;Upper;Ventral (anterior) Arm 1 day                      Imaging: No pertinent imaging reviewed  Recent Cultures (last 7 days):   Results from last 7 days   Lab Units 07/31/21 2004   BLOOD CULTURE  Received in Microbiology Lab  Culture in Progress  Received in Microbiology Lab  Culture in Progress         Last 24 Hours Medication List:   Current Facility-Administered Medications   Medication Dose Route Frequency Provider Last Rate    acetaminophen  650 mg Oral Q6H PRN Nataleehan Vinson PA-C      aluminum-magnesium hydroxide-simethicone  30 mL Oral Q6H PRN Yoselin Guevara PA-C      chlordiazePOXIDE  10 mg Oral Q8H [address] Fabienne A DAVE Charlton      Followed by   Rachael Yin ON 8/3/2021] chlordiazePOXIDE  10 mg Oral Q12H [address] Fabienne JAVAN DAVE Charlton      Followed by   Rachael Yin ON 8/4/2021] chlordiazePOXIDE  10 mg Oral HS Rober Lara PA-C      dicyclomine  20 mg Oral TID PRN Rober Lara PA-C      docusate sodium  100 mg Oral BID Julianne Pate MD      enoxaparin  40 mg Subcutaneous Daily Rober Lara PA-C      folic acid  1 mg Oral Daily Rober Lara PA-C      furosemide  20 mg Oral Daily Rober Lara PA-C      [START ON 8/3/2021] insulin glargine  40 Units Subcutaneous QAM Julianne Pate MD      insulin glargine  44 Units Subcutaneous HS Julianne Pate MD      insulin lispro  2-12 Units Subcutaneous TID AC Rober Lara PA-C      insulin lispro  2-12 Units Subcutaneous HS Rober Lara PA-C      Lidocaine Viscous HCl  15 mL Swish & Swallow 4x Daily PRN Rober Lara PA-C      lisinopril  20 mg Oral BID Rober Lara PA-C      melatonin  6 mg Oral HS Kenzie Murphy PA-C      multivitamin-minerals  1 tablet Oral Daily Rober Lara PA-C      nicotine  14 mg Transdermal Daily Ilene Fraser PA-C      pantoprazole  40 mg Oral Early Morning Rober Lara PA-C      polyethylene glycol  17 g Oral Daily Julianne Pate MD      thiamine  100 mg Oral Daily Rober Lara PA-C          Today, Patient Was Seen By: Julianne Pate MD    **Please Note: This note may have been constructed using a voice recognition system  **

## 2021-08-02 NOTE — ASSESSMENT & PLAN NOTE
No results found for: HGBA1C    Recent Labs     08/01/21  1951 08/01/21  2104 08/02/21  0713 08/02/21  1111   POCGLU 359* 332* 219* 291*       Blood Sugar Average: Last 72 hrs:  (P) 040 [id number]   · Blood glucose checks q i d , hypoglycemia protocol  · Patient noted initially with blood glucose 168, decreased to 31 and now 96 after D50  · Closely monitor blood glucose but will resume home increase Lantus  · Sliding scale insulin

## 2021-08-02 NOTE — ASSESSMENT & PLAN NOTE
· No signs of withdrawals currently  ·   ·  Drinks Tha's Hard lemonade 6-10 20 oz cans daily  · Placed on CIWA protocol  · Initiate Librium taper  · Case management consult to help with resources for possible rehab down in [place]

## 2021-08-02 NOTE — ANESTHESIA PREPROCEDURE EVALUATION
Procedure:  COLONOSCOPY  EGD    Relevant Problems   ENDO   (+) Diabetes mellitus type 1 (HCC)      GI/HEPATIC   (+) Alcoholic cirrhosis of liver without ascites (HCC)   (+) Cirrhosis of liver without ascites (HCC)   (+) GERD (gastroesophageal reflux disease)        Physical Exam    Airway    Mallampati score: III  TM Distance: >3 FB  Neck ROM: full     Dental       Cardiovascular  Cardiovascular exam normal    Pulmonary  Pulmonary exam normal     Other Findings        1  Systemic inflammatory response syndrome-no source of infection  -tachycardia and tachypnea  -lactic acid was elevated at 2 6 initially which has now resolved  -encourage p o  Intake  -follow-up blood culture results  -monitor without antibiotics     2  Intractable generalized abdominal pain and distension  -patient has a history of alcoholic cirrhosis but the CT scan does not show any ascites for abdominal pain and distension  -CT scan does show multiple hemangiomas and a new hypodense lesion for which a MRI has been recommended  -will get the MRI to determine morphology of the hypodense lesion  -also check for tumor markers like AFP  -see plan under alcohol abuse     3  Alcoholic cirrhosis of liver  -GI has been consulted for this ongoing abdominal pain for further recommendations     4  Alcohol abuse-ongoing  -patient is at high risk for varices which could be painful for which patient will need further GI workup like EGD and colonoscopy  Will defer to GI  -continue alcohol withdrawal protocol  -Librium has been initiated      5  Diabetes mellitus type 1  -sliding scale insulin  -diabetic diet     6  Constipation  -order laxatives and stool softeners          Anesthesia Plan  ASA Score- 3     Anesthesia Type- IV sedation with anesthesia with ASA Monitors  Additional Monitors:   Airway Plan:           Plan Factors-Exercise tolerance (METS): >4 METS  Chart reviewed  EKG reviewed  Existing labs reviewed   Patient summary reviewed  Induction- intravenous  Postoperative Plan-     Informed Consent- Anesthetic plan and risks discussed with patient  I personally reviewed this patient with the CRNA  Discussed and agreed on the Anesthesia Plan with the CRNA  Suleman Ambrosio 79-year-old male with history of HCV/alcoholic cirrhosis who presents with pain/weakness and abdominal discomfort  Patient originally is from [place] and is vacationing in the area  He does admit to recent alcohol use  Blood work on admission reveals mild elevation of transaminases and normal INR  Patient probably has mild alcoholic hepatitis  Patient does have abdominal pain/distension along with poor appetite  Discussed majority of his issues are likely related to alcohol use and alcohol cessation would be recommended  CT did reveal lesion in his liver which was not identified on his CT in [place]  Will proceed with quad phase MRI to further characterize and comprehensive liver workup while he is in-house

## 2021-08-02 NOTE — ASSESSMENT & PLAN NOTE
· For EGD colonoscopy today      · CT a negative for PE, negative for ascites but revealing cirrhosis of the liver, multiple hemangiomas and a hypodense lesion  · Appreciate GI consult due to severe abdominal distension  · Liver enzymes slightly elevated, avoid hepatotoxic agents

## 2021-08-03 VITALS
DIASTOLIC BLOOD PRESSURE: 68 MMHG | WEIGHT: 272 LBS | RESPIRATION RATE: 18 BRPM | BODY MASS INDEX: 41.22 KG/M2 | HEIGHT: 68 IN | SYSTOLIC BLOOD PRESSURE: 115 MMHG | HEART RATE: 62 BPM | TEMPERATURE: 96.6 F | OXYGEN SATURATION: 95 %

## 2021-08-03 LAB
ACTIN IGG SERPL-ACNC: 7 UNITS (ref 0–19)
GLUCOSE SERPL-MCNC: 245 MG/DL (ref 65–140)
HCV RNA SERPL NAA+PROBE-ACNC: NORMAL IU/ML
HCV RNA SERPL NAA+PROBE-ACNC: NORMAL IU/ML
HCV RNA SERPL NAA+PROBE-LOG IU: 7.3 LOG10 IU/ML
MITOCHONDRIA M2 IGG SER-ACNC: <20 UNITS (ref 0–20)
TEST INFORMATION: NORMAL

## 2021-08-03 PROCEDURE — 94760 N-INVAS EAR/PLS OXIMETRY 1: CPT

## 2021-08-03 PROCEDURE — 99239 HOSP IP/OBS DSCHRG MGMT >30: CPT | Performed by: FAMILY MEDICINE

## 2021-08-03 PROCEDURE — 94660 CPAP INITIATION&MGMT: CPT

## 2021-08-03 PROCEDURE — 82948 REAGENT STRIP/BLOOD GLUCOSE: CPT

## 2021-08-03 RX ORDER — CHLORDIAZEPOXIDE HYDROCHLORIDE 10 MG/1
10 CAPSULE, GELATIN COATED ORAL
Qty: 2 CAPSULE | Refills: 0 | Status: SHIPPED | OUTPATIENT
Start: 2021-08-04 | End: 2021-08-06

## 2021-08-03 RX ADMIN — FUROSEMIDE 20 MG: 40 TABLET ORAL at 08:55

## 2021-08-03 RX ADMIN — DOCUSATE SODIUM 100 MG: 100 CAPSULE, LIQUID FILLED ORAL at 08:55

## 2021-08-03 RX ADMIN — CHLORDIAZEPOXIDE HYDROCHLORIDE 10 MG: 10 CAPSULE ORAL at 05:13

## 2021-08-03 RX ADMIN — LISINOPRIL 20 MG: 20 TABLET ORAL at 08:55

## 2021-08-03 RX ADMIN — MULTIPLE VITAMINS W/ MINERALS TAB 1 TABLET: TAB ORAL at 08:55

## 2021-08-03 RX ADMIN — THIAMINE HCL TAB 100 MG 100 MG: 100 TAB at 08:54

## 2021-08-03 RX ADMIN — NICOTINE 14 MG: 14 PATCH, EXTENDED RELEASE TRANSDERMAL at 09:48

## 2021-08-03 RX ADMIN — INSULIN LISPRO 4 UNITS: 100 INJECTION, SOLUTION INTRAVENOUS; SUBCUTANEOUS at 08:49

## 2021-08-03 RX ADMIN — FOLIC ACID 1 MG: 1 TABLET ORAL at 08:54

## 2021-08-03 RX ADMIN — INSULIN GLARGINE 40 UNITS: 100 INJECTION, SOLUTION SUBCUTANEOUS at 08:51

## 2021-08-03 RX ADMIN — ENOXAPARIN SODIUM 40 MG: 40 INJECTION SUBCUTANEOUS at 09:01

## 2021-08-03 RX ADMIN — PANTOPRAZOLE SODIUM 40 MG: 40 TABLET, DELAYED RELEASE ORAL at 05:13

## 2021-08-03 NOTE — UTILIZATION REVIEW
Kristen Betancur MD   Physician   Hospitalist   Discharge Summary       Signed   Date of Service:  8/3/2021 11:02 AM               Signed             Show:Clear all  [x]Manual[x]Template[x]Copied    Added by:  [x]Keith Martinez MD    []Sloan for details  [address] [facility]  Discharge- Verito Lujan 1980, 42 y o  male MRN: [id number]  Unit/Bed#: -01 Encounter: [id number]  Primary Care Provider: No primary care provider on file     Date and time admitted to hospital: 7/31/2021  0:83 PM     * Alcoholic cirrhosis of liver without ascites ([facility] )  Assessment & Plan  · EGD colonoscopy  Shows no varices or source of abd pain  · Cleared for dc        · CT a negative for PE, negative for ascites but revealing cirrhosis of the liver, multiple hemangiomas and a hypodense lesion  · Appreciate GI consult due to severe abdominal distension  · Liver enzymes slightly elevated, avoid hepatotoxic agents     Alcohol use disorder, severe, dependence (HCC)  Assessment & Plan  · No signs of withdrawals currently  · Librium for 2 days on DC  ·    ·  Drinks Tha's Hard lemonade 6-10 20 oz cans daily  · Placed on CIWA protocol  · Initiate Librium taper  · Case management consult to help with resources for possible rehab down in [place]     Liver lesion, left lobe  Assessment & Plan  · MRI shows benign hepatic hemangiomata     Diabetes mellitus type 1 ([facility] )  Assessment & Plan  No results found for: HGBA1C            Recent Labs     08/02/21  1535 08/02/21  1609 08/02/21 2050 08/03/21  0848   POCGLU 286* 244* 214* 245*         Blood Sugar Average: Last 72 hrs:  (P) 257 [id number]   · Blood glucose checks q i d , hypoglycemia protocol  · Patient noted initially with blood glucose 168, decreased to 31 and now 96 after D50  · Closely monitor blood glucose but will resume home increase Lantus  · Sliding scale insulin            Medical Problems            Resolved Problems  Date Reviewed: 8/3/2021           None             Discharging Physician / Practitioner: Sonido Lopez MD  PCP: No primary care provider on file  Admission Date:       Admission Orders (From admission, onward)              Ordered          07/31/21 2204   Inpatient Admission  Once                       Discharge Date: 08/03/21     Consultations During Hospital Stay:  · GI     Procedures Performed:   · EGD colonoscopy     Significant Findings / Test Results:      FINDINGS:  · The cricopharynx, upper third of the esophagus, middle third of the esophagus, lower third of the esophagus, GE junction and Z-line appeared normal  Z-line is 40 cm from the incisors  · The cardia, fundus of the stomach, body of the stomach, greater curve of the stomach, lesser curve of the stomach, incisura, antrum, prepyloric region and pylorus appeared normal  Performed 6 random biopsies  · The duodenal bulb and 2nd part of the duodenum appeared normal        IMPRESSION:  1  Normal colonoscopy/normal terminal ileum      RECOMMENDATION:  Repeat screening colonoscopy in 10 years               Incidental Findings:   · MRI was done - benign hemangiomata noted      Test Results Pending at Discharge (will require follow up):   · none     Outpatient Tests Requested:  · none     Complications:  none     Reason for Admission: abd pain and distention     Hospital Course:   Cody Butler is a 42 y o  male patient who originally presented to the hospital on 7/31/2021 due to above complaint  Secondary to severe etoh dependance pt underwent EGD and colonoscopy for ruling out variceal disease   Results as dictated above  Cleared for DC as the pain has resolved  Alcohol cessation counseling done daily  No withdrawal noted so far, was started on librium and being discharged on librium for 2 days     Please see above list of diagnoses and related plan for additional information       Condition at Discharge: stable     Discharge Day Visit / Exam:   Subjective:  I am fine  Vitals: Blood Pressure: 115/68 (08/03/21 0706)  Pulse: 62 (08/03/21 0706)  Temperature: (!) 96 6 °F (35 9 °C) (08/03/21 0706)  Temp Source: Axillary (08/03/21 0256)  Respirations: 18 (08/03/21 0611)  Height: 5' 8" (172 7 cm) (07/31/21 1904)  Weight - Scale: 123 kg (272 lb) (07/31/21 1904)  SpO2: 95 % (08/03/21 0706)  Exam:   Physical Exam General- Awake, alert and oriented x 3, looks comfortable  HEENT- Normocephalic, atraumatic, oral mucosa- moist  Neck- Supple, No carotid bruit, no JVD  CVS- Normal S1/ S2, Regular rate and rhythm, No murmur, No edema  Respiratory system- B/L clear breath sounds, no wheezing  Abdomen- Soft, distended, no tenderness, Bowel sound- present 4 quads  Genitourinary- No suprapubic tenderness, No CVA tenderness  Skin- No new bruise or rash  Musculoskeletal- No gross deformity  Psych- No acute psychosis  CNS- CN II- XII grossly intact, No acute focal neurologic deficit noted        Discussion with Family: Pt has been updated thoroughly and wants to update his family himself     Discharge instructions/Information to patient and family:   See after visit summary for information provided to patient and family        Provisions for Follow-Up Care:  See after visit summary for information related to follow-up care and any pertinent home health orders  Disposition:   Home     Planned Readmission: no     Discharge Statement:  I spent 45 minutes discharging the patient  This time was spent on the day of discharge  I had direct contact with the patient on the day of discharge  Greater than 50% of the total time was spent examining patient, answering all patient questions, arranging and discussing plan of care with patient as well as directly providing post-discharge instructions    Additional time then spent on discharge activities      Discharge Medications:  See after visit summary for reconciled discharge medications provided to patient and/or family        **Please Note: This note may have been constructed using a voice recognition system**

## 2021-08-03 NOTE — RESPIRATORY THERAPY NOTE
08/02/21 2100   Respiratory Assessment   Resp Comments stoppped to ask pt what time to apply cpap pt asked to come back at 1 am rn made aware

## 2021-08-03 NOTE — DISCHARGE SUMMARY
[facility]  Discharge- Shante Sharp 1980, 42 y o  male MRN: [id number]  Unit/Bed#: -Zoltan Encounter: [id number]  Primary Care Provider: No primary care provider on file  Date and time admitted to hospital: 7/31/2021  4:37 PM    * Alcoholic cirrhosis of liver without ascites ([facility] [[email or web address] 75 )  Assessment & Plan  · EGD colonoscopy  Shows no varices or source of abd pain  · Cleared for dc      · CT a negative for PE, negative for ascites but revealing cirrhosis of the liver, multiple hemangiomas and a hypodense lesion  · Appreciate GI consult due to severe abdominal distension  · Liver enzymes slightly elevated, avoid hepatotoxic agents    Alcohol use disorder, severe, dependence (HCC)  Assessment & Plan  · No signs of withdrawals currently  · Librium for 2 days on DC  ·   ·  Drinks Tha's Hard lemonade 6-10 20 oz cans daily  · Placed on CIWA protocol  · Initiate Librium taper  · Case management consult to help with resources for possible rehab down in [place]    Liver lesion, left lobe  Assessment & Plan  · MRI shows benign hepatic hemangiomata    Diabetes mellitus type 1 ([facility] [[email or web address] 75 )  Assessment & Plan  No results found for: HGBA1C    Recent Labs     08/02/21  1535 08/02/21  1609 08/02/21  2050 08/03/21  0848   POCGLU 286* 244* 214* 245*       Blood Sugar Average: Last 72 hrs:  (P) 257 [id number]   · Blood glucose checks q i d , hypoglycemia protocol  · Patient noted initially with blood glucose 168, decreased to 31 and now 96 after D50  · Closely monitor blood glucose but will resume home increase Lantus  · Sliding scale insulin      Medical Problems     Resolved Problems  Date Reviewed: 8/3/2021    None              Discharging Physician / Practitioner: Jeovanny Quiles MD  PCP: No primary care provider on file    Admission Date:   Admission Orders (From admission, onward)     Ordered        07/31/21 2204  Inpatient Admission  Once                   Discharge Date: 08/03/21    Consultations During Hospital Stay:  · GI    Procedures Performed:   · EGD colonoscopy    Significant Findings / Test Results:      FINDINGS:  · The cricopharynx, upper third of the esophagus, middle third of the esophagus, lower third of the esophagus, GE junction and Z-line appeared normal  Z-line is 40 cm from the incisors  · The cardia, fundus of the stomach, body of the stomach, greater curve of the stomach, lesser curve of the stomach, incisura, antrum, prepyloric region and pylorus appeared normal  Performed 6 random biopsies  · The duodenal bulb and 2nd part of the duodenum appeared normal       IMPRESSION:  1  Normal colonoscopy/normal terminal ileum      RECOMMENDATION:  Repeat screening colonoscopy in 10 years             Incidental Findings:   · MRI was done - benign hemangiomata noted     Test Results Pending at Discharge (will require follow up):   · none     Outpatient Tests Requested:  · none    Complications:  none    Reason for Admission: abd pain and distention    Hospital Course:   Alondra Roldan is a 42 y o  male patient who originally presented to the hospital on 7/31/2021 due to above complaint  Secondary to severe etoh dependance pt underwent EGD and colonoscopy for ruling out variceal disease  Results as dictated above  Cleared for DC as the pain has resolved  Alcohol cessation counseling done daily  No withdrawal noted so far, was started on librium and being discharged on librium for 2 days    Please see above list of diagnoses and related plan for additional information       Condition at Discharge: stable    Discharge Day Visit / Exam:   Subjective:  I am fine  Vitals: Blood Pressure: 115/68 (08/03/21 0706)  Pulse: 62 (08/03/21 0706)  Temperature: (!) 96 6 °F (35 9 °C) (08/03/21 0706)  Temp Source: Axillary (08/03/21 0256)  Respirations: 18 (08/03/21 0611)  Height: 5' 8" (172 7 cm) (07/31/21 1904)  Weight - Scale: 123 kg (272 lb) (07/31/21 1904)  SpO2: 95 % (08/03/21 0706)  Exam:   Physical Exam General- Awake, alert and oriented x 3, looks comfortable  HEENT- Normocephalic, atraumatic, oral mucosa- moist  Neck- Supple, No carotid bruit, no JVD  CVS- Normal S1/ S2, Regular rate and rhythm, No murmur, No edema  Respiratory system- B/L clear breath sounds, no wheezing  Abdomen- Soft, distended, no tenderness, Bowel sound- present 4 quads  Genitourinary- No suprapubic tenderness, No CVA tenderness  Skin- No new bruise or rash  Musculoskeletal- No gross deformity  Psych- No acute psychosis  CNS- CN II- XII grossly intact, No acute focal neurologic deficit noted      Discussion with Family: Pt has been updated thoroughly and wants to update his family himself    Discharge instructions/Information to patient and family:   See after visit summary for information provided to patient and family  Provisions for Follow-Up Care:  See after visit summary for information related to follow-up care and any pertinent home health orders  Disposition:   Home    Planned Readmission: no     Discharge Statement:  I spent 45 minutes discharging the patient  This time was spent on the day of discharge  I had direct contact with the patient on the day of discharge  Greater than 50% of the total time was spent examining patient, answering all patient questions, arranging and discussing plan of care with patient as well as directly providing post-discharge instructions  Additional time then spent on discharge activities  Discharge Medications:  See after visit summary for reconciled discharge medications provided to patient and/or family        **Please Note: This note may have been constructed using a voice recognition system**

## 2021-08-03 NOTE — ASSESSMENT & PLAN NOTE
· No signs of withdrawals currently  · Librium for 2 days on DC  ·   ·  Drinks Tha's Hard lemonade 6-10 20 oz cans daily  · Placed on CIWA protocol  · Initiate Librium taper  · Case management consult to help with resources for possible rehab down in [place]

## 2021-08-03 NOTE — ASSESSMENT & PLAN NOTE
· EGD colonoscopy  Shows no varices or source of abd pain  · Cleared for dc      · CT a negative for PE, negative for ascites but revealing cirrhosis of the liver, multiple hemangiomas and a hypodense lesion  · Appreciate GI consult due to severe abdominal distension  · Liver enzymes slightly elevated, avoid hepatotoxic agents

## 2021-08-03 NOTE — RESPIRATORY THERAPY NOTE
08/03/21 0611   Respiratory Assessment   Assessment Type Assess only   General Appearance Awake;Drowsy   Respiratory Pattern Normal   Chest Assessment Chest expansion symmetrical   Bilateral Breath Sounds Clear;Diminished   Resp Comments pt does not want to come off cpap yet    O2 Device v60 cpap    Non-Invasive Information   O2 Interface Device Full face mask   Non-Invasive Ventilation Mode CPAP  (v60)   $ Intermittent NIV Yes   SpO2 98 %   $ Pulse Oximetry Spot Check Charge Completed   Non-Invasive Settings   FiO2 (%) 21   PEEP/CPAP (cm H2O) 12   Rise Time 2   Non-Invasive Readings   Total Rate 18   Spontaneous Vt (mL) 487   Spontaneous MV (mL) 9 2   Leak (lpm) 12   Skin Intervention Skin intact   Non-Invasive Alarms   Insp Pressure High (cm H20) 20   Insp Pressure Low (cm H20) 5   Low Insp Pressure Time (sec) 20 sec   MV Low (L/min) 4   Vt High (mL) 1250   Vt Low (mL) 200   High Resp Rate (BPM) 40 BPM   Low Resp Rate (BPM) 8 BPM   Apnea Interval (sec) 20   Apnea Pressure 12   Maintenance   Water bag changed No

## 2021-08-03 NOTE — ASSESSMENT & PLAN NOTE
No results found for: HGBA1C    Recent Labs     08/02/21  1535 08/02/21  1609 08/02/21  2050 08/03/21  0848   POCGLU 286* 244* 214* 245*       Blood Sugar Average: Last 72 hrs:  (P) 257 [id number]   · Blood glucose checks q i d , hypoglycemia protocol  · Patient noted initially with blood glucose 168, decreased to 31 and now 96 after D50  · Closely monitor blood glucose but will resume home increase Lantus  · Sliding scale insulin

## 2021-08-05 ENCOUNTER — TELEPHONE (OUTPATIENT)
Dept: GASTROENTEROLOGY | Facility: CLINIC | Age: 41
End: 2021-08-05

## 2021-08-05 NOTE — TELEPHONE ENCOUNTER
----- Message from Juan Carlos Yao DO sent at 8/4/2021  3:38 PM EDT -----  Please call the patient with the biopsy results  Biopsies of the small intestine were negative for celiac disease and cancer  Biopsies of the ascending and sigmoid colon were benign and negative for microscopic colitis

## 2021-08-06 LAB
BACTERIA BLD CULT: NORMAL
BACTERIA BLD CULT: NORMAL